# Patient Record
Sex: FEMALE | Race: WHITE | NOT HISPANIC OR LATINO | Employment: FULL TIME | URBAN - METROPOLITAN AREA
[De-identification: names, ages, dates, MRNs, and addresses within clinical notes are randomized per-mention and may not be internally consistent; named-entity substitution may affect disease eponyms.]

---

## 2017-01-28 ENCOUNTER — ALLSCRIPTS OFFICE VISIT (OUTPATIENT)
Dept: OTHER | Facility: OTHER | Age: 47
End: 2017-01-28

## 2017-01-30 ENCOUNTER — ALLSCRIPTS OFFICE VISIT (OUTPATIENT)
Dept: OTHER | Facility: OTHER | Age: 47
End: 2017-01-30

## 2017-06-19 ENCOUNTER — GENERIC CONVERSION - ENCOUNTER (OUTPATIENT)
Dept: OTHER | Facility: OTHER | Age: 47
End: 2017-06-19

## 2017-08-15 ENCOUNTER — ALLSCRIPTS OFFICE VISIT (OUTPATIENT)
Dept: OTHER | Facility: OTHER | Age: 47
End: 2017-08-15

## 2017-10-12 ENCOUNTER — ALLSCRIPTS OFFICE VISIT (OUTPATIENT)
Dept: OTHER | Facility: OTHER | Age: 47
End: 2017-10-12

## 2017-10-16 ENCOUNTER — ALLSCRIPTS OFFICE VISIT (OUTPATIENT)
Dept: OTHER | Facility: OTHER | Age: 47
End: 2017-10-16

## 2018-01-13 VITALS
HEIGHT: 65 IN | BODY MASS INDEX: 32.49 KG/M2 | TEMPERATURE: 97.9 F | SYSTOLIC BLOOD PRESSURE: 134 MMHG | HEART RATE: 64 BPM | RESPIRATION RATE: 12 BRPM | DIASTOLIC BLOOD PRESSURE: 80 MMHG | WEIGHT: 195 LBS

## 2018-01-13 VITALS
DIASTOLIC BLOOD PRESSURE: 70 MMHG | WEIGHT: 195 LBS | HEIGHT: 65 IN | HEART RATE: 84 BPM | SYSTOLIC BLOOD PRESSURE: 110 MMHG | BODY MASS INDEX: 32.49 KG/M2 | TEMPERATURE: 98 F | RESPIRATION RATE: 16 BRPM

## 2018-01-13 VITALS
BODY MASS INDEX: 32.32 KG/M2 | HEIGHT: 65 IN | TEMPERATURE: 98.4 F | SYSTOLIC BLOOD PRESSURE: 118 MMHG | RESPIRATION RATE: 16 BRPM | DIASTOLIC BLOOD PRESSURE: 78 MMHG | HEART RATE: 80 BPM | WEIGHT: 194 LBS

## 2018-01-13 NOTE — CONSULTS
Chief Complaint  Patient here for Pre-op clearance  Bladder sling on 10/24/17      History of Present Illness  Pre-Op Visit (Brief): The procedure is a(n) bladder sling scheduled for 10/24/17 with Dr Tiffany Wright  The indication for surgery is urinary incontinence  Surgical Risk Assessment:   Prior Anesthesia: She had prior anesthesia, no prior adverse reaction to edidural anesthesia, no prior adverse reaction to spinal anesthesia and no prior adverse reaction to general anesthesia  Exercise Capacity: able to walk four blocks without symptoms and able to walk two flights of stairs without symptoms  Lifestyle Factors: denies alcohol use, denies tobacco use and denies illegal drug use  Symptoms: no symptoms  Other MARYELLEN risk factors include normal BMI, female gender, normal neck circumference and age under 48  Predicted risk of MARYELLEN: None  Pertinent Family History: no pertinent family history  Living Situation: home is secure and supportive and no post-op concerns with her living situation  HPI: in usual state of health   no concerns, c/o today      Review of Systems    Constitutional: No fever, no chills, feels well, no tiredness, no recent weight gain or weight loss  Eyes: No complaints of eye pain, no red eyes, no eyesight problems, no discharge, no dry eyes, no itching of eyes  ENT: no complaints of earache, no loss of hearing, no nose bleeds, no nasal discharge, no sore throat, no hoarseness  Cardiovascular: No complaints of slow heart rate, no fast heart rate, no chest pain, no palpitations, no leg claudication, no lower extremity edema  Respiratory: No complaints of shortness of breath, no wheezing, no cough, no SOB on exertion, no orthopnea, no PND  Gastrointestinal: No complaints of abdominal pain, no constipation, no nausea or vomiting, no diarrhea, no bloody stools  Genitourinary: No complaints of dysuria, no incontinence, no pelvic pain, no dysmenorrhea, no vaginal discharge or bleeding  Musculoskeletal: No complaints of arthralgias, no myalgias, no joint swelling or stiffness, no limb pain or swelling  Integumentary: No complaints of skin rash or lesions, no itching, no skin wounds, no breast pain or lump  Neurological: No complaints of headache, no confusion, no convulsions, no numbness, no dizziness or fainting, no tingling, no limb weakness, no difficulty walking  Psychiatric: Not suicidal, no sleep disturbance, no anxiety or depression, no change in personality, no emotional problems  Endocrine: No complaints of proptosis, no hot flashes, no muscle weakness, no deepening of the voice, no feelings of weakness  Hematologic/Lymphatic: No complaints of swollen glands, no swollen glands in the neck, does not bleed easily, does not bruise easily  Active Problems    1  Acute bacterial sinusitis (461 9) (J01 90,B96 89)   2  Acute conjunctivitis (372 00) (H10 30)   3  Bilateral hearing loss (389 9) (H91 93)   4  Encounter for screening mammogram for breast cancer (V76 12) (Z12 31)   5  Swelling of face (784 2) (R22 0)    Past Medical History    · Acute maxillary sinusitis (461 0) (J01 00)   · History of Acute nonsuppurative otitis media, unspecified laterality (381 00) (H65 199)   · Acute sinusitis (461 9) (J01 90)   · History of Acute viral pharyngitis (462) (J02 8,B97 89)   · History of Joint pain, knee (719 46) (M25 569)   · 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)    The active problems and past medical history were reviewed and updated today  Surgical History    · History of Tubal Ligation    The surgical history was reviewed and updated today  Family History    The family history was reviewed and updated today  Social History    · Never A Smoker  The social history was reviewed and updated today  Allergies    1   Penicillins    Vitals  Signs    Temperature: 97 9 FHeart Rate: 19ABZOWLLWTTK: 35LUFHGGRQ: 705FCZSRQJFY: 26JWPSDF: 5 ft 5 inWeight: 195 lb BMI Calculated: 32 45BSA Calculated: 1 96    Physical Exam    Constitutional   General appearance: No acute distress, well appearing and well nourished  Head and Face   Head and face: Normal     Eyes   Conjunctiva and lids: No swelling, erythema or discharge  Pupils and irises: Equal, round, reactive to light  Ears, Nose, Mouth, and Throat   Otoscopic examination: Tympanic membranes translucent with normal light reflex  Canals patent without erythema  Nasal mucosa, septum, and turbinates: Normal without edema or erythema  Oropharynx: Normal with no erythema, edema, exudate or lesions  Neck   Thyroid: Normal, no thyromegaly  Pulmonary   Respiratory effort: No increased work of breathing or signs of respiratory distress  Auscultation of lungs: Clear to auscultation  Cardiovascular   Auscultation of heart: Normal rate and rhythm, normal S1 and S2, no murmurs  Carotid pulses: 2+ bilaterally  Abdominal aorta: Normal     Pedal pulses: 2+ bilaterally  Peripheral vascular exam: Normal     Examination of extremities for edema and/or varicosities: Normal     Abdomen   Abdomen: Non-tender, no masses  Liver and spleen: No hepatomegaly or splenomegaly  Lymphatic   Palpation of lymph nodes in neck: No lymphadenopathy  Musculoskeletal   Gait and station: Normal     Skin   Skin and subcutaneous tissue: Normal without rashes or lesions  Psychiatric   Judgment and insight: Normal     Mood and affect: Normal        Results/Data  PHQ-2 Adult Depression Screening 12Oct2017 08:46AM User, s     Test Name Result Flag Reference   PHQ-2 Adult Depression Score 0     Over the last two weeks, how often have you been bothered by any of the following problems?   Little interest or pleasure in doing things: Not at all - 0  Feeling down, depressed, or hopeless: Not at all - 0   PHQ-2 Adult Depression Screening Negative       A 12 lead ECG was performed and was normal    Rhythm and rate: normal sinus rhythm  P-waves: the P wave is normal    QRS: the QRS is normal    ST segment: the ST segments are normal       Assessment    1  Encounter for pre-operative examination (V72 84) (Z01 818)   2  Urinary incontinence in female (788 30) (R32)    Plan     Encounter for pre-operative examination, Urinary incontinence in female    · Follow-up PRN Evaluation and Treatment  Follow-up  Status: Complete  Done:  12Oct2017    Discussion/Summary  Surgical Clearance: She is at a LOW risk from a cardiovascular standpoint at this time without any additional cardiac testing  Reevaluation needed, if she should present with symptoms prior to surgery/procedure  Surgical clearance faxed to Dr Dr Walton Flank         Signatures   Electronically signed by : MAKAYLA Durna; Oct 12 2017  8:56AM EST                       (Author)    Electronically signed by : Saturnino Lauren DO; Oct 12 2017 11:49AM EST                       (Author)

## 2018-01-14 VITALS
SYSTOLIC BLOOD PRESSURE: 128 MMHG | HEART RATE: 78 BPM | TEMPERATURE: 98.4 F | HEIGHT: 65 IN | RESPIRATION RATE: 14 BRPM | DIASTOLIC BLOOD PRESSURE: 90 MMHG | BODY MASS INDEX: 32.65 KG/M2 | WEIGHT: 196 LBS

## 2018-01-15 NOTE — MISCELLANEOUS
Message  Return to work or school:   Oscar Crabtree is under my professional care  She was seen in my office on 10/16/17       Excused 10/16, 10/17/17  Angi BENAVIDES        Signatures   Electronically signed by : MAKAYLA Zayas; Oct 16 2017  9:52AM EST                       (Author)

## 2018-01-22 VITALS
BODY MASS INDEX: 32.99 KG/M2 | RESPIRATION RATE: 14 BRPM | DIASTOLIC BLOOD PRESSURE: 90 MMHG | TEMPERATURE: 98.2 F | WEIGHT: 198 LBS | HEIGHT: 65 IN | HEART RATE: 72 BPM | SYSTOLIC BLOOD PRESSURE: 140 MMHG

## 2018-01-31 ENCOUNTER — OFFICE VISIT (OUTPATIENT)
Dept: FAMILY MEDICINE CLINIC | Facility: CLINIC | Age: 48
End: 2018-01-31
Payer: COMMERCIAL

## 2018-01-31 VITALS
BODY MASS INDEX: 33.15 KG/M2 | HEART RATE: 80 BPM | RESPIRATION RATE: 16 BRPM | HEIGHT: 65 IN | DIASTOLIC BLOOD PRESSURE: 90 MMHG | SYSTOLIC BLOOD PRESSURE: 130 MMHG | WEIGHT: 199 LBS | TEMPERATURE: 98 F

## 2018-01-31 DIAGNOSIS — J01.80 ACUTE NON-RECURRENT SINUSITIS OF OTHER SINUS: Primary | ICD-10-CM

## 2018-01-31 PROCEDURE — 99213 OFFICE O/P EST LOW 20 MIN: CPT | Performed by: FAMILY MEDICINE

## 2018-01-31 RX ORDER — NORETHINDRONE ACETATE AND ETHINYL ESTRADIOL AND FERROUS FUMARATE 1MG-20(21)
1 KIT ORAL DAILY
Refills: 0 | COMMUNITY
Start: 2017-11-17 | End: 2018-07-12

## 2018-01-31 NOTE — LETTER
January 31, 2018     Patient: Andrew Greene   YOB: 1970   Date of Visit: 1/31/2018       To Whom it May Concern:    Andrew Greene is under my professional care  She was seen in my office on 1/31/2018  She may return to work on 2/1/18  If you have any questions or concerns, please don't hesitate to call           Sincerely,          Ronna Le MD        CC: No Recipients

## 2018-01-31 NOTE — PROGRESS NOTES
Jolene     Janice Valencia is a 52 y o  female who presents for evaluation of sinus pain  Symptoms include: congestion, facial pain, headaches, sinus pressure and b/l earache  Onset of symptoms was 1 day ago  Symptoms have been unchanged since that time  Past history is significant for no history of pneumonia or bronchitis  Patient is a non-smoker  No therapy tried, had flu like symptoms 2 wks ago -  Was Tx with tamiflu     The following portions of the patient's history were reviewed and updated as appropriate: allergies, current medications, past family history, past medical history, past social history, past surgical history and problem list     Review of Systems  Eyes: negative  Respiratory: negative  Gastrointestinal: negative  Objective     General appearance: alert and oriented, in no acute distress  Head: Normocephalic, without obvious abnormality, atraumatic, sinuses tender to percussion  Ears: normal TM's and external ear canals both ears  Nose: Nares normal  Septum midline  Mucosa normal  No drainage or sinus tenderness  Throat: lips, mucosa, and tongue normal; teeth and gums normal  Lungs: clear to auscultation bilaterally  Assessment/Plan     Acute viral sinusitis  Nasal saline sprays  MucinexD OTC, fluids  rto prn

## 2018-01-31 NOTE — PATIENT INSTRUCTIONS
Rhinosinusitis   AMBULATORY CARE:   Rhinosinusitis (RS)  is inflammation of your nose and sinuses  It commonly begins as a virus, often as a common cold  Viruses usually last 7 to 10 days and do not need treatment  When the virus does not get better on its own, you may have bacterial RS  This means that bacteria have begun to grow inside your sinuses  Acute RS lasts less than 4 weeks  Chronic RS lasts 12 weeks or more  Recurrent RS is when you have 4 or more episodes of RS in one year  Your signs and symptoms  may be worse when you lie on your back or try to sleep  You may have any of the following:  · Stuffy nose and reduced sense of smell     · Runny nose with thick yellow or green mucus     · Pressure or pain on your face or a headache     · Pain in your teeth or bad breath     · Ear pain or pressure     · Fever or cough     · Tiredness  Seek care immediately if:   · You have double vision or you cannot see  · You have a stiff neck, a fever, or a bad headache  · Your eyeball bulges out or you cannot move your eye  · Your eye and eyelid are red, swollen, and painful  · You cannot open your eye  · You are more sleepy than normal, or you notice changes in your ability to think, move, or talk  · You have swelling of your forehead or scalp  Contact your healthcare provider if:   · Your symptoms are worse or do not improve after 3 to 5 days of treatment  · You have questions or concerns about your condition or care  Treatment for rhinosinusitis  may include any of the following:  · Acetaminophen  decreases pain and fever  It is available without a doctor's order  Ask how much to take and how often to take it  Follow directions  Acetaminophen can cause liver damage if not taken correctly  · NSAIDs , such as ibuprofen, help decrease swelling, pain, and fever  This medicine is available with or without a doctor's order   NSAIDs can cause stomach bleeding or kidney problems in certain people  If you take blood thinner medicine, always ask your healthcare provider if NSAIDs are safe for you  Always read the medicine label and follow directions  · Nasal steroid sprays  decrease inflammation in your nose and sinuses  · Decongestants  reduce swelling and drain mucus in the nose and sinuses  They may help you breathe easier  · Antihistamines  dry mucus in the nose and relieve sneezing  · Antibiotics  treat a bacterial infection and may be needed if your symptoms do not improve or they get worse  · Take your medicine as directed  Contact your healthcare provider if you think your medicine is not helping or if you have side effects  Tell him or her if you are allergic to any medicine  Keep a list of the medicines, vitamins, and herbs you take  Include the amounts, and when and why you take them  Bring the list or the pill bottles to follow-up visits  Carry your medicine list with you in case of an emergency  Self-care:   · Rinse your sinuses  Use a sinus rinse device to rinse your nasal passages with a saline (salt water) solution  This will help thin the mucus in your nose and rinse away pollen and dirt  It will also help reduce swelling so you can breathe normally  Ask your healthcare provider how often to do this  · Breathe in steam   Heat a bowl of water until you see steam  Lean over the bowl and make a tent over your head with a large towel  Breathe deeply for about 20 minutes  Be careful not to get too close to the steam or burn yourself  Do this 3 times a day  You can also breathe deeply when you take a hot shower  · Sleep with your head elevated  Place an extra pillow under your head before you go to sleep to help your sinuses drain  · Drink liquids as directed  Ask your healthcare provider how much liquid to drink each day and which liquids are best for you  Liquids will thin the mucus in your nose and help it drain   Avoid drinks that contain alcohol or caffeine  · Do not smoke, and avoid secondhand smoke  Nicotine and other chemicals in cigarettes and cigars can make your symptoms worse  Ask your healthcare provider for information if you currently smoke and need help to quit  E-cigarettes or smokeless tobacco still contain nicotine  Talk to your healthcare provider before you use these products  Follow up with your healthcare provider as directed: Follow up if your symptoms are worse or not better after 3 to 5 days of treatment  Write down your questions so you remember to ask them during your visits  © 2017 [employer] Information is for End User's use only and may not be sold, redistributed or otherwise used for commercial purposes  All illustrations and images included in CareNotes® are the copyrighted property of A D A M [employer] Inc  or [employer]  The above information is an  only  It is not intended as medical advice for individual conditions or treatments  Talk to your doctor, nurse or pharmacist before following any medical regimen to see if it is safe and effective for you

## 2018-03-19 ENCOUNTER — OFFICE VISIT (OUTPATIENT)
Dept: AUDIOLOGY | Facility: CLINIC | Age: 48
End: 2018-03-19
Payer: COMMERCIAL

## 2018-03-19 DIAGNOSIS — H91.93 BILATERAL HEARING LOSS, UNSPECIFIED HEARING LOSS TYPE: Primary | ICD-10-CM

## 2018-03-19 DIAGNOSIS — H90.3 SENSORINEURAL HEARING LOSS, BILATERAL: Primary | ICD-10-CM

## 2018-03-19 PROCEDURE — 92567 TYMPANOMETRY: CPT | Performed by: AUDIOLOGIST

## 2018-03-19 PROCEDURE — 92557 COMPREHENSIVE HEARING TEST: CPT | Performed by: AUDIOLOGIST

## 2018-03-19 NOTE — LETTER
2018     Saturnino Lauren, [address] [place] [address]    Patient: Becka Waller   YOB: 1970   Date of Visit: 3/19/2018       Dear Dr Sharyle Raddle: Thank you for referring Becka Waller to me for evaluation  Below are my notes for this consultation  If you have questions, please do not hesitate to call me  Sincerely,        Miranda Cedeño , CCC/A  Clinical Audiologist   [place]  [id number]        CC: No Recipients  Stephanie Bolden  3/20/2018 10:17 AM  Sign at close encounter  [facility] [address] EVALUATION      Name:  Becka Waller  :  1970  Age:  52 y o  Date of Evaluation: 3/19/18    History: hearing loss and use of binaural DEIDRE's from 2016  Reason for visit: Becka Waller is being seen today at the request of Dr Sharyle Raddle for an evaluation of hearing  It has been two years since her last evaluation and she is reporting more difficulty hearing lately  Hearing aid history 2016; warranty exp  19       Right Ear:  Mau Helling 2 Pro DEIDRE       Left Ear:  Mau Helling 2 Pro DEIDRE     Hearing aid settings were checked and increased by two clicks overall  She has VC use and has been using appropriately  Aids were cleaned and checked  EVALUATION:    Otoscopic Evaluation:   Right Ear: Clear and healthy ear canal and tympanic membrane   Left Ear: Clear and healthy ear canal and tympanic membrane    Tympanometry:   See attached     Audiogram Results:  Mild to moderate SNHL bilaterally with good word recognition scores  No significant threshold shifts from previous testing in 2016    *see attached audiograms    RECOMMENDATIONS:  1 Year Hearing Eval and Other:hearing aid service 6 months     PATIENT EDUCATION:   Discussed results and recommendations with Mrs Kaylan Martinez  Questions were addressed and the patient was encouraged to contact our department should concerns arise        Miranda Cedeño , CCC-A, [place]# [id number], Hearing Aid KE[facility]# [id number]  Clinical Audiologist

## 2018-03-20 ENCOUNTER — OFFICE VISIT (OUTPATIENT)
Dept: FAMILY MEDICINE CLINIC | Facility: CLINIC | Age: 48
End: 2018-03-20
Payer: COMMERCIAL

## 2018-03-20 VITALS
DIASTOLIC BLOOD PRESSURE: 74 MMHG | TEMPERATURE: 97.7 F | RESPIRATION RATE: 16 BRPM | HEART RATE: 72 BPM | HEIGHT: 65 IN | WEIGHT: 198 LBS | SYSTOLIC BLOOD PRESSURE: 136 MMHG | BODY MASS INDEX: 32.99 KG/M2

## 2018-03-20 DIAGNOSIS — N94.6 SEVERE DYSMENORRHEA: ICD-10-CM

## 2018-03-20 DIAGNOSIS — R10.2 PELVIC PAIN: Primary | ICD-10-CM

## 2018-03-20 LAB
SL AMB  POCT GLUCOSE, UA: ABNORMAL
SL AMB LEUKOCYTE ESTERASE,UA: ABNORMAL
SL AMB POCT BILIRUBIN,UA: ABNORMAL
SL AMB POCT BLOOD,UA: 250
SL AMB POCT CLARITY,UA: CLEAR
SL AMB POCT COLOR,UA: ABNORMAL
SL AMB POCT KETONES,UA: ABNORMAL
SL AMB POCT NITRITE,UA: ABNORMAL
SL AMB POCT PH,UA: 5
SL AMB POCT SPECIFIC GRAVITY,UA: 1.02
SL AMB POCT URINE PROTEIN: ABNORMAL
SL AMB POCT UROBILINOGEN: ABNORMAL

## 2018-03-20 PROCEDURE — 81003 URINALYSIS AUTO W/O SCOPE: CPT | Performed by: NURSE PRACTITIONER

## 2018-03-20 PROCEDURE — 99213 OFFICE O/P EST LOW 20 MIN: CPT | Performed by: NURSE PRACTITIONER

## 2018-03-20 NOTE — PATIENT INSTRUCTIONS
Dysmenorrhea   WHAT YOU NEED TO KNOW:   What is dysmenorrhea? Dysmenorrhea is painful menstrual cramps at or around the time of your monthly period  What causes dysmenorrhea? Your body normally produces chemicals each month to help your uterus contract  When too many of these chemicals are made, your uterus contracts too much and causes pain  Dysmenorrhea may also be caused by any of the following:  · Abnormal structure of your uterus or vagina    · A narrow cervix    · Growth in or on your uterus or ovaries    · Medical conditions, such as pelvic inflammatory disease, endometriosis, or uterine fibroids    · A copper intrauterine device (IUD)  What increases my risk for dysmenorrhea? · Never been pregnant    · Obesity    · Smoking    · Family history of painful menstrual cramps    · Pelvic infection    · Longer monthly period cycle    · Medical conditions, such as a sexually transmitted infection or endometriosis  What are the signs and symptoms of dysmenorrhea? · Mild to severe pain    · Cramping pain in lower abdomen or low back    · Bloating    · Headache    · Diarrhea  How is dysmenorrhea diagnosed? Your healthcare provider can usually diagnose dysmenorrhea by your signs and symptoms  Tell him when your symptoms started and if you have pain between your monthly periods  He may ask if anything relieves your pain, such as heat or medicine  Tell your healthcare provider if you are sexually active or have ever been pregnant  You may need any of the following:  · A blood test  will check for pregnancy  · A pelvic exam  may be needed to check the size and shape of your uterus and ovaries  Your healthcare provider gently inserts a warmed speculum into your vagina  A speculum is a tool that opens your vagina to show your cervix  · A cervical culture  may be needed to check for infection  Your healthcare provider will use a swab to collect a sample of cells from your cervix   This will be sent to a lab for tests     · An ultrasound  will show abnormal structure of your reproductive organs  Sound waves are used to show pictures on a monitor  How is dysmenorrhea treated? Dysmenorrhea can be controlled with lifestyle changes and medicines  It usually improves with age and pregnancy  · Medicines:      ¨ NSAIDs  help decrease swelling and pain or fever  This medicine is available with or without a doctor's order  NSAIDs can cause stomach bleeding or kidney problems in certain people  If you take blood thinner medicine, always ask your healthcare provider if NSAIDs are safe for you  Always read the medicine label and follow directions  ¨ Birth control medicine  may help decrease your pain  This medicine may be birth control pills or an IUD that does not contain copper  · Transcutaneous electric nerve stimulation  (TENS), is a device used to stimulate your nerves and decrease pain  Ask your healthcare provider for more information about TENS  How can I manage my symptoms? · Eat low-fat foods  Increase the amount of vegetables and raw seeds you eat  Ask your healthcare provider if you should take vitamin B or magnesium supplements  These will help decrease your pain  Do not eat dairy products or eggs  · Apply heat  on your lower abdomen for 20 to 30 minutes every 2 hours for as many days as directed  Heat helps decrease pain and muscle spasms  · Manage your stress  Stress can make your symptoms worse  Try relaxation exercises, such as deep breathing  · Exercise regularly  Ask your healthcare provider about the best exercise plan for you  Exercise can help decrease pain  · Keep a record of your pain  Write down when your pain and periods start and stop  Bring the record with you to your follow-up visits  · Do not smoke  Avoid others who smoke  If you smoke, it is never too late to quit  Smoking can increase your risk for dysmenorrhea   Ask your healthcare provider for information if you need help quitting  When should I contact my healthcare provider? · You have anxiety or feel depressed  · Your periods are early, late, or more painful than usual     · You have questions or concerns about your condition or care  When should I seek immediate care or call 911? · You have severe pain  · You have heavy vaginal bleeding and you feel faint  · You have sudden chest pain and trouble breathing  CARE AGREEMENT:   You have the right to help plan your care  Learn about your health condition and how it may be treated  Discuss treatment options with your caregivers to decide what care you want to receive  You always have the right to refuse treatment  The above information is an  only  It is not intended as medical advice for individual conditions or treatments  Talk to your doctor, nurse or pharmacist before following any medical regimen to see if it is safe and effective for you  © 2017 [employer] Information is for End User's use only and may not be sold, redistributed or otherwise used for commercial purposes  All illustrations and images included in CareNotes® are the copyrighted property of A D A M , Inc  or [facility]

## 2018-03-20 NOTE — PROGRESS NOTES
Assessment/Plan:    Problem List Items Addressed This Visit     None      Visit Diagnoses     Pelvic pain    -  Primary    Relevant Orders    US pelvis complete non OB    Severe dysmenorrhea        Relevant Orders    US pelvis complete non OB      UA negative  Check pelvic u/s r/o fibroids, cysts  Cont OCP  Start ibu 2 days prior to menses onset  rto after testing    Patient Instructions   Dysmenorrhea   WHAT YOU NEED TO KNOW:   What is dysmenorrhea? Dysmenorrhea is painful menstrual cramps at or around the time of your monthly period  What causes dysmenorrhea? Your body normally produces chemicals each month to help your uterus contract  When too many of these chemicals are made, your uterus contracts too much and causes pain  Dysmenorrhea may also be caused by any of the following:  · Abnormal structure of your uterus or vagina    · A narrow cervix    · Growth in or on your uterus or ovaries    · Medical conditions, such as pelvic inflammatory disease, endometriosis, or uterine fibroids    · A copper intrauterine device (IUD)  What increases my risk for dysmenorrhea? · Never been pregnant    · Obesity    · Smoking    · Family history of painful menstrual cramps    · Pelvic infection    · Longer monthly period cycle    · Medical conditions, such as a sexually transmitted infection or endometriosis  What are the signs and symptoms of dysmenorrhea? · Mild to severe pain    · Cramping pain in lower abdomen or low back    · Bloating    · Headache    · Diarrhea  How is dysmenorrhea diagnosed? Your healthcare provider can usually diagnose dysmenorrhea by your signs and symptoms  Tell him when your symptoms started and if you have pain between your monthly periods  He may ask if anything relieves your pain, such as heat or medicine  Tell your healthcare provider if you are sexually active or have ever been pregnant  You may need any of the following:  · A blood test  will check for pregnancy      · A pelvic exam may be needed to check the size and shape of your uterus and ovaries  Your healthcare provider gently inserts a warmed speculum into your vagina  A speculum is a tool that opens your vagina to show your cervix  · A cervical culture  may be needed to check for infection  Your healthcare provider will use a swab to collect a sample of cells from your cervix  This will be sent to a lab for tests  · An ultrasound  will show abnormal structure of your reproductive organs  Sound waves are used to show pictures on a monitor  How is dysmenorrhea treated? Dysmenorrhea can be controlled with lifestyle changes and medicines  It usually improves with age and pregnancy  · Medicines:      ¨ NSAIDs  help decrease swelling and pain or fever  This medicine is available with or without a doctor's order  NSAIDs can cause stomach bleeding or kidney problems in certain people  If you take blood thinner medicine, always ask your healthcare provider if NSAIDs are safe for you  Always read the medicine label and follow directions  ¨ Birth control medicine  may help decrease your pain  This medicine may be birth control pills or an IUD that does not contain copper  · Transcutaneous electric nerve stimulation  (TENS), is a device used to stimulate your nerves and decrease pain  Ask your healthcare provider for more information about TENS  How can I manage my symptoms? · Eat low-fat foods  Increase the amount of vegetables and raw seeds you eat  Ask your healthcare provider if you should take vitamin B or magnesium supplements  These will help decrease your pain  Do not eat dairy products or eggs  · Apply heat  on your lower abdomen for 20 to 30 minutes every 2 hours for as many days as directed  Heat helps decrease pain and muscle spasms  · Manage your stress  Stress can make your symptoms worse  Try relaxation exercises, such as deep breathing  · Exercise regularly    Ask your healthcare provider about the best exercise plan for you  Exercise can help decrease pain  · Keep a record of your pain  Write down when your pain and periods start and stop  Bring the record with you to your follow-up visits  · Do not smoke  Avoid others who smoke  If you smoke, it is never too late to quit  Smoking can increase your risk for dysmenorrhea  Ask your healthcare provider for information if you need help quitting  When should I contact my healthcare provider? · You have anxiety or feel depressed  · Your periods are early, late, or more painful than usual     · You have questions or concerns about your condition or care  When should I seek immediate care or call 911? · You have severe pain  · You have heavy vaginal bleeding and you feel faint  · You have sudden chest pain and trouble breathing  CARE AGREEMENT:   You have the right to help plan your care  Learn about your health condition and how it may be treated  Discuss treatment options with your caregivers to decide what care you want to receive  You always have the right to refuse treatment  The above information is an  only  It is not intended as medical advice for individual conditions or treatments  Talk to your doctor, nurse or pharmacist before following any medical regimen to see if it is safe and effective for you  © 2017 [employer]  Information is for End User's use only and may not be sold, redistributed or otherwise used for commercial purposes  All illustrations and images included in CareNotes® are the copyrighted property of [employer]  or [employer]  Return after testing  Subjective:      Patient ID: Eileen Kingston is a 52 y o  female  Chief Complaint   Patient presents with    Back Pain     Abdominal Pain, during PMS, 3x in last 6 months        Pelvic Pain   The patient's primary symptoms include pelvic pain  The patient's pertinent negatives include no missed menses   Primary symptoms comment: severe cramping during menses  This is a recurrent problem  The current episode started yesterday  The problem occurs constantly  The problem has been unchanged  The pain is moderate  The problem affects both sides  She is not pregnant  Associated symptoms include back pain  Pertinent negatives include no anorexia, constipation, diarrhea, discolored urine, dysuria, fever, frequency, nausea or vomiting  Nothing aggravates the symptoms  She has tried NSAIDs for the symptoms  The treatment provided mild relief  She uses oral contraceptives for contraception  Her menstrual history has been regular  Her past medical history is significant for menorrhagia and metrorrhagia  (No known fibriods, ovarian cysts)       The following portions of the patient's history were reviewed and updated as appropriate: allergies, current medications, past family history, past medical history, past social history, past surgical history and problem list     Review of Systems   Constitutional: Negative for fever  Respiratory: Negative  Cardiovascular: Negative  Gastrointestinal: Negative for anorexia, constipation, diarrhea, nausea and vomiting  Endocrine: Negative  Genitourinary: Positive for menorrhagia, menstrual problem and pelvic pain  Negative for dyspareunia, dysuria, frequency and missed menses  Musculoskeletal: Positive for back pain  Neurological: Negative  Hematological: Does not bruise/bleed easily  Current Outpatient Prescriptions   Medication Sig Dispense Refill    JUNEL FE 1/20 1-20 MG-MCG per tablet Take 1 tablet by mouth daily  0     No current facility-administered medications for this visit          Objective:    /74 (BP Location: Left arm, Patient Position: Sitting, Cuff Size: Adult)   Pulse 72   Temp 97 7 °F (36 5 °C)   Resp 16   Ht 5' 5" (1 651 m)   Wt 89 8 kg (198 lb)   LMP 03/20/2018   BMI 32 95 kg/m²        Physical Exam   Constitutional: She is oriented to person, place, and time  She appears well-developed and well-nourished  No distress  Neck: No thyromegaly present  Cardiovascular: Normal rate, regular rhythm, normal heart sounds and intact distal pulses  No murmur heard  Pulmonary/Chest: Effort normal and breath sounds normal  No respiratory distress  Abdominal: Soft  Normal appearance and bowel sounds are normal  There is tenderness in the suprapubic area  Musculoskeletal:        Lumbar back: She exhibits tenderness  She exhibits normal range of motion and no deformity  Neurological: She is alert and oriented to person, place, and time  Psychiatric: She has a normal mood and affect  Vitals reviewed               Rikki Bautista, [address] [place] [address]

## 2018-03-20 NOTE — PROGRESS NOTES
AUDIOLOGY AUDIOMETRIC EVALUATION      Name:  Jasen Cardenas  :  1970  Age:  52 y o  Date of Evaluation: 3/19/18    History: hearing loss and use of binaural DEIDRE's from 2016  Reason for visit: Jasen Cardenas is being seen today at the request of Dr Briana Rubinstein for an evaluation of hearing  It has been two years since her last evaluation and she is reporting more difficulty hearing lately  Hearing aid history 2016; warranty exp  19       Right Ear:  Charyl Motto 2 Pro DEIDRE       Left Ear:  Charyl Motto 2 Pro DEIDRE     Hearing aid settings were checked and increased by two clicks overall  She has VC use and has been using appropriately  Aids were cleaned and checked  EVALUATION:    Otoscopic Evaluation:   Right Ear: Clear and healthy ear canal and tympanic membrane   Left Ear: Clear and healthy ear canal and tympanic membrane    Tympanometry:   See attached     Audiogram Results:  Mild to moderate SNHL bilaterally with good word recognition scores  No significant threshold shifts from previous testing in 2016    *see attached audiograms    RECOMMENDATIONS:  1 Year Hearing Eval and Other:hearing aid service 6 months     PATIENT EDUCATION:   Discussed results and recommendations with Mrs Demetrius Albert  Questions were addressed and the patient was encouraged to contact our department should concerns arise        Miranda James , CCC-A, [place]# [id number], Hearing Aid Dispenser, [place]# [id number]  Clinical Audiologist

## 2018-03-28 DIAGNOSIS — R10.2 PELVIC PAIN: Primary | ICD-10-CM

## 2018-03-28 DIAGNOSIS — N94.6 SEVERE DYSMENORRHEA: ICD-10-CM

## 2018-07-12 ENCOUNTER — OFFICE VISIT (OUTPATIENT)
Dept: FAMILY MEDICINE CLINIC | Facility: CLINIC | Age: 48
End: 2018-07-12
Payer: COMMERCIAL

## 2018-07-12 VITALS
HEIGHT: 65 IN | SYSTOLIC BLOOD PRESSURE: 140 MMHG | BODY MASS INDEX: 33.15 KG/M2 | WEIGHT: 199 LBS | RESPIRATION RATE: 16 BRPM | DIASTOLIC BLOOD PRESSURE: 90 MMHG | HEART RATE: 68 BPM | TEMPERATURE: 97.5 F

## 2018-07-12 DIAGNOSIS — R94.6 ABNORMAL THYROID FUNCTION TEST: ICD-10-CM

## 2018-07-12 DIAGNOSIS — R03.0 TRANSIENT ELEVATED BLOOD PRESSURE: Primary | ICD-10-CM

## 2018-07-12 DIAGNOSIS — E01.0 THYROMEGALY: ICD-10-CM

## 2018-07-12 PROCEDURE — 99213 OFFICE O/P EST LOW 20 MIN: CPT | Performed by: NURSE PRACTITIONER

## 2018-07-12 NOTE — PROGRESS NOTES
Assessment/Plan:    Problem List Items Addressed This Visit     None      Visit Diagnoses     Transient elevated blood pressure    -  Primary    Relevant Orders    US thyroid    Abnormal thyroid function test         7/11/18    Relevant Orders    US thyroid    Thyromegaly        Relevant Orders    US thyroid        Blood work not available while pt in office  Called pt and review labs and treatment plan  HTN is new dx  Asymptomatic as this time  Will check at work  rto in 2 weeks for recheck  If remains elevated, will start low dose med  Only abn thyroid lab is   She is asymptomatic  Advised monitoring closely with thyroid u/s  Recheck levels in 6 weeks  Hold off on meds for now unless she becomes symptomatic  Patient Instructions   Autoimmune Disease   AMBULATORY CARE:   An autoimmune disease  causes your body's immune system to attack healthy cells in your body by mistake  This causes inflammation in the affected areas  The disease may affect any part of your body, including skin, organs, blood, your digestive system, and connective tissues  There are many autoimmune diseases, such as lupus, celiac disease, and diabetes  Common signs and symptoms:  Signs and symptoms depend on the type of autoimmune disease and the body systems affected  Symptoms may be mild or severe, and may come and go  You may have many of the following if you have an autoimmune disease that affects more than one body system:  · Red, warm, painful, swollen area or joints    · Joint pain, stiffness, or reduced range of motion    · Tiredness, weakness, or muscle pain    · Fever    · Weight gain or loss, or no appetite     · Diarrhea, stomach cramps, or bloating    · Hair loss    · Rash or changes in skin color    · Red, inflamed eyes  Seek care immediately if:   · You have severe pain or swelling  · You have a fever along with stiffness and pain  · You have blood in your urine, bowel movement, or vomit      · You have severe abdominal pain  · You are confused or feel dizzy or faint  Contact your healthcare provider if:   · You have new or worsening symptoms  · You are urinating less than usual     · You are bleeding from your nose or gums  · You bruise easily  · You have questions or concerns about your condition or care  Treatment  will depend on the type of autoimmune disease you have  You may need any of the following:  · Medicines  may be given to replace thyroid hormones, insulin, or other hormones your body is not producing  Medicines may also reduce your immune system's ability to attack healthy cells or decrease inflammation in muscles or joints  You may need to use topical creams or lotions to control a rash or other symptoms that affect your skin  · Prescription pain medicine  may be given  Ask your healthcare provider how to take this medicine safely  · NSAIDs , such as ibuprofen, help decrease swelling, pain, and fever  This medicine is available with or without a doctor's order  NSAIDs can cause stomach bleeding or kidney problems in certain people  If you take blood thinner medicine, always ask your healthcare provider if NSAIDs are safe for you  Always read the medicine label and follow directions  · Acetaminophen  reduces pain and fever  This medicine is available without a doctor's order  Ask how much to take and how often to take it  Follow directions  Acetaminophen can cause liver damage if not taken correctly  · Steroids  help reduce swelling and relieve pain  · A blood transfusion  may be needed if your blood is affected  Manage an autoimmune disease:   · Rest as needed  Talk to your healthcare provider if you are having trouble sleeping because of pain or other symptoms  Rest your joints if they are stiff or painful  Your healthcare provider may suggest support devices such as crutches or splints to help your joints rest      · Eat a variety of healthy foods    Healthy foods include fruits, vegetables, lean meats, fish, and low-fat dairy products  Work with your healthcare provider or dietitian to create healthy meal plans  You may need to stop eating certain foods if your digestive system is affected by your autoimmune disease  · Go to physical or occupational therapy as directed  A physical therapist can help you create an exercise plan  Exercise may help increase your energy  Exercise can also help keep stiff joints flexible and increase range of motion  An occupational therapist can help you learn to do your daily activities when you have pain or swelling during a flare  · Do not smoke  Nicotine can damage blood vessels and make it more difficult to manage your symptoms  Ask your healthcare provider for information if you currently smoke and need help to quit  E-cigarettes or smokeless tobacco still contain nicotine  Talk to your healthcare provider before you use these products  · Talk to your healthcare provider about pregnancy  If you are a woman and want to get pregnant, talk to your healthcare provider  You or your baby might be at risk for complications  You may need to wait until your disease is controlled or your medications are finished before you get pregnant  You may also have trouble getting pregnant because of your disease  Your healthcare provider may be able to suggest ways to improve your ability to become pregnant  · Manage stress  Stress may slow healing and lead to illness  Learn ways to control stress, such as relaxation, deep breathing, or listening to music  Manage flares:  A flare means something triggered your symptoms  Stress, cold weather, and sunlight are examples of triggers  Your healthcare provider can help you create a management plan that includes what to do if you have a flare  Treat flares quickly to help prevent serious illness  · Apply ice or heat as directed    Ice helps reduce pain and swelling, and may help prevent tissue damage  Use a cold compress, or put crushed ice in a bag  Cover it with a towel and apply to the painful area for 15 to 20 minutes every hour, or as directed  Heat helps reduce pain and muscle spasms  Apply a warm compress to the area for 20 minutes every 2 hours, or as directed  · Elevate the area above the level of your heart  Elevation can help reduce swelling and pain, especially in your joints  Elevate the area as often as possible  Follow up with your healthcare provider as directed: You may need ongoing tests or treatment  Write down your questions so you remember to ask them during your visits  © 2017 [employer] [employer] Information is for End User's use only and may not be sold, redistributed or otherwise used for commercial purposes  All illustrations and images included in CareNotes® are the copyrighted property of [employer]  or [employer]  The above information is an  only  It is not intended as medical advice for individual conditions or treatments  Talk to your doctor, nurse or pharmacist before following any medical regimen to see if it is safe and effective for you  Return in about 2 weeks (around 7/26/2018)  Subjective:      Patient ID: Leonidas Velazco is a 50 y o  female  Chief Complaint   Patient presents with    Blood Pressure Check     178/104 yesterday at [facility]    then 148/98 this morning  Honey sent by Pawel Tam for elevated BP and abn thyroid studies    She is essentially asymptomatic aside from severe PMS sxs    Was seen on 7/11/18 for OCP check     BP was significantly elevated  178/102    She has never had bp problems    Blood work was drawn which revealed elevated TPO  TSH 2 4  T3 126, T4 9 8   Lipids borderline, cbc, cmp wnl    Denies hx of thyroid problems        The following portions of the patient's history were reviewed and updated as appropriate: allergies, current medications, past family history, past medical history, past social history, past surgical history and problem list     Review of Systems   Constitutional: Negative  Eyes: Negative for visual disturbance  Respiratory: Negative  Cardiovascular: Negative  Endocrine: Negative  Genitourinary: Positive for menstrual problem  Neurological: Negative  Psychiatric/Behavioral: Negative  No current outpatient prescriptions on file  No current facility-administered medications for this visit  Objective:    /90 (BP Location: Left arm, Patient Position: Sitting, Cuff Size: Adult)   Pulse 68   Temp 97 5 °F (36 4 °C) (Temporal)   Resp 16   Ht 5' 5" (1 651 m)   Wt 90 3 kg (199 lb)   BMI 33 12 kg/m²        Physical Exam   Constitutional: She is oriented to person, place, and time  Vital signs are normal  She appears well-developed and well-nourished  No distress  HENT:   Mouth/Throat: Oropharynx is clear and moist    Neck: Thyromegaly present  Cardiovascular: Normal rate, regular rhythm, normal heart sounds and intact distal pulses  Pulmonary/Chest: Effort normal and breath sounds normal  No respiratory distress  Musculoskeletal: She exhibits no edema  Neurological: She is alert and oriented to person, place, and time  No cranial nerve deficit  Coordination normal    Skin: Skin is warm and dry  Psychiatric: She has a normal mood and affect  Her behavior is normal    Nursing note and vitals reviewed               [address]

## 2018-07-12 NOTE — PATIENT INSTRUCTIONS
Autoimmune Disease   AMBULATORY CARE:   An autoimmune disease  causes your body's immune system to attack healthy cells in your body by mistake  This causes inflammation in the affected areas  The disease may affect any part of your body, including skin, organs, blood, your digestive system, and connective tissues  There are many autoimmune diseases, such as lupus, celiac disease, and diabetes  Common signs and symptoms:  Signs and symptoms depend on the type of autoimmune disease and the body systems affected  Symptoms may be mild or severe, and may come and go  You may have many of the following if you have an autoimmune disease that affects more than one body system:  · Red, warm, painful, swollen area or joints    · Joint pain, stiffness, or reduced range of motion    · Tiredness, weakness, or muscle pain    · Fever    · Weight gain or loss, or no appetite     · Diarrhea, stomach cramps, or bloating    · Hair loss    · Rash or changes in skin color    · Red, inflamed eyes  Seek care immediately if:   · You have severe pain or swelling  · You have a fever along with stiffness and pain  · You have blood in your urine, bowel movement, or vomit  · You have severe abdominal pain  · You are confused or feel dizzy or faint  Contact your healthcare provider if:   · You have new or worsening symptoms  · You are urinating less than usual     · You are bleeding from your nose or gums  · You bruise easily  · You have questions or concerns about your condition or care  Treatment  will depend on the type of autoimmune disease you have  You may need any of the following:  · Medicines  may be given to replace thyroid hormones, insulin, or other hormones your body is not producing  Medicines may also reduce your immune system's ability to attack healthy cells or decrease inflammation in muscles or joints   You may need to use topical creams or lotions to control a rash or other symptoms that affect your skin     · Prescription pain medicine  may be given  Ask your healthcare provider how to take this medicine safely  · NSAIDs , such as ibuprofen, help decrease swelling, pain, and fever  This medicine is available with or without a doctor's order  NSAIDs can cause stomach bleeding or kidney problems in certain people  If you take blood thinner medicine, always ask your healthcare provider if NSAIDs are safe for you  Always read the medicine label and follow directions  · Acetaminophen  reduces pain and fever  This medicine is available without a doctor's order  Ask how much to take and how often to take it  Follow directions  Acetaminophen can cause liver damage if not taken correctly  · Steroids  help reduce swelling and relieve pain  · A blood transfusion  may be needed if your blood is affected  Manage an autoimmune disease:   · Rest as needed  Talk to your healthcare provider if you are having trouble sleeping because of pain or other symptoms  Rest your joints if they are stiff or painful  Your healthcare provider may suggest support devices such as crutches or splints to help your joints rest      · Eat a variety of healthy foods  Healthy foods include fruits, vegetables, lean meats, fish, and low-fat dairy products  Work with your healthcare provider or dietitian to create healthy meal plans  You may need to stop eating certain foods if your digestive system is affected by your autoimmune disease  · Go to physical or occupational therapy as directed  A physical therapist can help you create an exercise plan  Exercise may help increase your energy  Exercise can also help keep stiff joints flexible and increase range of motion  An occupational therapist can help you learn to do your daily activities when you have pain or swelling during a flare  · Do not smoke  Nicotine can damage blood vessels and make it more difficult to manage your symptoms   Ask your healthcare provider for information if you currently smoke and need help to quit  E-cigarettes or smokeless tobacco still contain nicotine  Talk to your healthcare provider before you use these products  · Talk to your healthcare provider about pregnancy  If you are a woman and want to get pregnant, talk to your healthcare provider  You or your baby might be at risk for complications  You may need to wait until your disease is controlled or your medications are finished before you get pregnant  You may also have trouble getting pregnant because of your disease  Your healthcare provider may be able to suggest ways to improve your ability to become pregnant  · Manage stress  Stress may slow healing and lead to illness  Learn ways to control stress, such as relaxation, deep breathing, or listening to music  Manage flares:  A flare means something triggered your symptoms  Stress, cold weather, and sunlight are examples of triggers  Your healthcare provider can help you create a management plan that includes what to do if you have a flare  Treat flares quickly to help prevent serious illness  · Apply ice or heat as directed  Ice helps reduce pain and swelling, and may help prevent tissue damage  Use a cold compress, or put crushed ice in a bag  Cover it with a towel and apply to the painful area for 15 to 20 minutes every hour, or as directed  Heat helps reduce pain and muscle spasms  Apply a warm compress to the area for 20 minutes every 2 hours, or as directed  · Elevate the area above the level of your heart  Elevation can help reduce swelling and pain, especially in your joints  Elevate the area as often as possible  Follow up with your healthcare provider as directed: You may need ongoing tests or treatment  Write down your questions so you remember to ask them during your visits    © 2017 [employer] Information is for End User's use only and may not be sold, redistributed or otherwise used for commercial purposes  All illustrations and images included in CareNotes® are the copyrighted property of [employer]  or [employer]  The above information is an  only  It is not intended as medical advice for individual conditions or treatments  Talk to your doctor, nurse or pharmacist before following any medical regimen to see if it is safe and effective for you

## 2018-07-24 ENCOUNTER — OFFICE VISIT (OUTPATIENT)
Dept: FAMILY MEDICINE CLINIC | Facility: CLINIC | Age: 48
End: 2018-07-24
Payer: COMMERCIAL

## 2018-07-24 VITALS
DIASTOLIC BLOOD PRESSURE: 88 MMHG | WEIGHT: 199 LBS | HEART RATE: 76 BPM | SYSTOLIC BLOOD PRESSURE: 150 MMHG | BODY MASS INDEX: 33.15 KG/M2 | TEMPERATURE: 97.9 F | HEIGHT: 65 IN | RESPIRATION RATE: 16 BRPM

## 2018-07-24 DIAGNOSIS — I10 ESSENTIAL HYPERTENSION: Primary | ICD-10-CM

## 2018-07-24 PROCEDURE — 1036F TOBACCO NON-USER: CPT | Performed by: NURSE PRACTITIONER

## 2018-07-24 PROCEDURE — 99213 OFFICE O/P EST LOW 20 MIN: CPT | Performed by: NURSE PRACTITIONER

## 2018-07-24 PROCEDURE — 3008F BODY MASS INDEX DOCD: CPT | Performed by: NURSE PRACTITIONER

## 2018-07-24 RX ORDER — LOSARTAN POTASSIUM 25 MG/1
25 TABLET ORAL DAILY
Qty: 30 TABLET | Refills: 5 | Status: SHIPPED | OUTPATIENT
Start: 2018-07-24 | End: 2020-09-08

## 2018-07-24 NOTE — PROGRESS NOTES
Assessment/Plan:    Problem List Items Addressed This Visit     Essential hypertension - Primary    Relevant Medications    losartan (COZAAR) 25 mg tablet        Patient's blood pressure is not controlled  Cardiovascular risk factors include: none  Current plan of care: medication changes per orders, dietary sodium restriction, regular aerobic exercise and weight loss  Reviewed risks of hypertension and principles of treatment  Blood pressure goal <140/90  Only abn thyroid lab is   She is asymptomatic  Recheck levels in 12weeks  Hold off on meds for now unless she becomes symptomatic  Patient Instructions     Chronic Hypertension   WHAT YOU NEED TO KNOW:   Hypertension is high blood pressure (BP)  Your BP is the force of your blood moving against the walls of your arteries  Normal BP is less than 120/80  Prehypertension is between 120/80 and 139/89  Hypertension is 140/90 or higher  Hypertension causes your BP to get so high that your heart has to work much harder than normal  This can damage your heart  Chronic hypertension is a long-term condition that you can control with a healthy lifestyle or medicines  A controlled blood pressure helps protect your organs, such as your heart, lungs, brain, and kidneys  DISCHARGE INSTRUCTIONS:   Call 911 for any of the following:   · You have discomfort in your chest that feels like squeezing, pressure, fullness, or pain  · You become confused or have difficulty speaking  · You suddenly feel lightheaded or have trouble breathing  · You have pain or discomfort in your back, neck, jaw, stomach, or arm  Return to the emergency department if:   · You have a severe headache or vision loss  · You have weakness in an arm or leg  Contact your healthcare provider if:   · You feel faint, dizzy, confused, or drowsy  · You have been taking your BP medicine and your BP is still higher than your healthcare provider says it should be      · You have questions or concerns about your condition or care  Medicines: You may need any of the following:  · Medicine  may be used to help lower your BP  You may need more than one type of medicine  Take the medicine exactly as directed  · Diuretics  help decrease extra fluid that collects in your body  This will help lower your BP  You may urinate more often while you take this medicine  · Cholesterol medicine  helps lower your cholesterol level  A low cholesterol level helps prevent heart disease and makes it easier to control your blood pressure  · Take your medicine as directed  Contact your healthcare provider if you think your medicine is not helping or if you have side effects  Tell him or her if you are allergic to any medicine  Keep a list of the medicines, vitamins, and herbs you take  Include the amounts, and when and why you take them  Bring the list or the pill bottles to follow-up visits  Carry your medicine list with you in case of an emergency  Follow up with your healthcare provider as directed: You will need to return to have your blood pressure checked and to have other lab tests done  Write down your questions so you remember to ask them during your visits  Manage chronic hypertension:  Talk with your healthcare provider about these and other ways to manage hypertension:  · Take your BP at home  Sit and rest for 5 minutes before you take your BP  Extend your arm and support it on a flat surface  Your arm should be at the same level as your heart  Follow the directions that came with your BP monitor  If possible, take at least 2 BP readings each time  Take your BP at least twice a day at the same times each day, such as morning and evening  Keep a record of your BP readings and bring it to your follow-up visits  Ask your healthcare provider what your blood pressure should be  · Limit sodium (salt) as directed  Too much sodium can affect your fluid balance   Check labels to find low-sodium or no-salt-added foods  Some low-sodium foods use potassium salts for flavor  Too much potassium can also cause health problems  Your healthcare provider will tell you how much sodium and potassium are safe for you to have in a day  He or she may recommend that you limit sodium to 2,300 mg a day  · Follow the meal plan recommended by your healthcare provider  A dietitian or your provider can give you more information on low-sodium plans or the DASH (Dietary Approaches to Stop Hypertension) eating plan  The DASH plan is low in sodium, unhealthy fats, and total fat  It is high in potassium, calcium, and fiber  · Exercise to maintain a healthy weight  Exercise at least 30 minutes per day, on most days of the week  This will help decrease your blood pressure  Ask about the best exercise plan for you  · Decrease stress  This may help lower your BP  Learn ways to relax, such as deep breathing or listening to music  · Limit alcohol  Women should limit alcohol to 1 drink a day  Men should limit alcohol to 2 drinks a day  A drink of alcohol is 12 ounces of beer, 5 ounces of wine, or 1½ ounces of liquor  · Do not smoke  Nicotine and other chemicals in cigarettes and cigars can increase your BP and also cause lung damage  Ask your healthcare provider for information if you currently smoke and need help to quit  E-cigarettes or smokeless tobacco still contain nicotine  Talk to your healthcare provider before you use these products  © 2017 [employer] Information is for End User's use only and may not be sold, redistributed or otherwise used for commercial purposes  All illustrations and images included in CareNotes® are the copyrighted property of [employer]  or [employer]  The above information is an  only  It is not intended as medical advice for individual conditions or treatments   Talk to your doctor, nurse or pharmacist before following any medical regimen to see if it is safe and effective for you  Return in about 3 months (around 10/24/2018)  Subjective:      Patient ID: Stanley France is a 50 y o  female  Chief Complaint   Patient presents with    Blood Pressure Check     review thyroid       Honey is here for bp check    She is essentially asymptomatic aside from severe PMS sxs    Was seen by gyn on 7/11/18 for OCP check     BP was significantly elevated  178/102    She has never had bp problems    Blood work was drawn which revealed elevated TPO  TSH 2 4  T3 126, T4 9 8  Lipids borderline, cbc, cmp wnl    Denies hx of thyroid problems    Thyroid u/s showed nodules        The following portions of the patient's history were reviewed and updated as appropriate: allergies, current medications, past family history, past medical history, past social history, past surgical history and problem list     Review of Systems   Constitutional: Negative  Eyes: Negative for visual disturbance  Respiratory: Negative  Cardiovascular: Negative  Endocrine: Negative  Genitourinary: Positive for menstrual problem  Neurological: Negative  Psychiatric/Behavioral: Negative  Current Outpatient Prescriptions   Medication Sig Dispense Refill    losartan (COZAAR) 25 mg tablet Take 1 tablet (25 mg total) by mouth daily 30 tablet 5     No current facility-administered medications for this visit  Objective:    /84 (BP Location: Left arm, Patient Position: Sitting, Cuff Size: Adult)   Pulse 76   Temp 97 9 °F (36 6 °C) (Temporal)   Resp 16   Ht 5' 5" (1 651 m)   Wt 90 3 kg (199 lb)   BMI 33 12 kg/m²        Physical Exam   Constitutional: She is oriented to person, place, and time  Vital signs are normal  She appears well-developed and well-nourished  No distress  HENT:   Mouth/Throat: Oropharynx is clear and moist    Neck: Thyromegaly present     Cardiovascular: Normal rate, regular rhythm, normal heart sounds and intact distal pulses  Pulmonary/Chest: Effort normal and breath sounds normal  No respiratory distress  Musculoskeletal: She exhibits no edema  Neurological: She is alert and oriented to person, place, and time  No cranial nerve deficit  Coordination normal    Skin: Skin is warm and dry  Psychiatric: She has a normal mood and affect  Her behavior is normal    Nursing note and vitals reviewed               Priya Marvin, [address] [place] [address]

## 2018-07-24 NOTE — PATIENT INSTRUCTIONS

## 2019-05-01 ENCOUNTER — OFFICE VISIT (OUTPATIENT)
Dept: AUDIOLOGY | Facility: CLINIC | Age: 49
End: 2019-05-01

## 2019-05-01 DIAGNOSIS — H90.3 SENSORINEURAL HEARING LOSS, BILATERAL: Primary | ICD-10-CM

## 2019-07-17 ENCOUNTER — TELEPHONE (OUTPATIENT)
Dept: FAMILY MEDICINE CLINIC | Facility: CLINIC | Age: 49
End: 2019-07-17

## 2019-07-17 NOTE — TELEPHONE ENCOUNTER
----- Message from Christian Diaz sent at 7/17/2019  2:00 PM EDT -----  Regarding: [facility] Pt Needs Appt  Hi - This [facility] pt is overdue for an office visit/BP check (she was told to follow up in 3 months last July 2018)  She has many open gaps in care  I know appt availability is very limited at [facility] - any time in 2019 is fine  Please outreach to schedule an appt and route this message back to [facility] when appt is scheduled  Thank you

## 2019-07-31 ENCOUNTER — OFFICE VISIT (OUTPATIENT)
Dept: FAMILY MEDICINE CLINIC | Facility: CLINIC | Age: 49
End: 2019-07-31
Payer: COMMERCIAL

## 2019-07-31 ENCOUNTER — TELEPHONE (OUTPATIENT)
Dept: FAMILY MEDICINE CLINIC | Facility: CLINIC | Age: 49
End: 2019-07-31

## 2019-07-31 VITALS
HEART RATE: 78 BPM | HEIGHT: 65 IN | TEMPERATURE: 98.1 F | RESPIRATION RATE: 16 BRPM | SYSTOLIC BLOOD PRESSURE: 130 MMHG | WEIGHT: 196.4 LBS | DIASTOLIC BLOOD PRESSURE: 88 MMHG | BODY MASS INDEX: 32.72 KG/M2

## 2019-07-31 DIAGNOSIS — I10 ESSENTIAL HYPERTENSION: Primary | ICD-10-CM

## 2019-07-31 DIAGNOSIS — Z13.0 SCREENING FOR DEFICIENCY ANEMIA: ICD-10-CM

## 2019-07-31 DIAGNOSIS — Z13.29 THYROID DISORDER SCREEN: ICD-10-CM

## 2019-07-31 DIAGNOSIS — Z13.220 LIPID SCREENING: ICD-10-CM

## 2019-07-31 DIAGNOSIS — Z13.1 DIABETES MELLITUS SCREENING: ICD-10-CM

## 2019-07-31 PROCEDURE — 3725F SCREEN DEPRESSION PERFORMED: CPT | Performed by: NURSE PRACTITIONER

## 2019-07-31 PROCEDURE — 99214 OFFICE O/P EST MOD 30 MIN: CPT | Performed by: NURSE PRACTITIONER

## 2019-07-31 PROCEDURE — 3008F BODY MASS INDEX DOCD: CPT | Performed by: NURSE PRACTITIONER

## 2019-07-31 PROCEDURE — 36415 COLL VENOUS BLD VENIPUNCTURE: CPT | Performed by: NURSE PRACTITIONER

## 2019-07-31 PROCEDURE — 3075F SYST BP GE 130 - 139MM HG: CPT | Performed by: NURSE PRACTITIONER

## 2019-07-31 PROCEDURE — 1036F TOBACCO NON-USER: CPT | Performed by: NURSE PRACTITIONER

## 2019-07-31 NOTE — PROGRESS NOTES
Assessment/Plan:    Problem List Items Addressed This Visit        Cardiovascular and Mediastinum    Essential hypertension - Primary    Relevant Orders    CBC and Platelet    Basic metabolic panel    TSH, 3rd generation with Free T4 reflex      Other Visit Diagnoses     Thyroid disorder screen        Relevant Orders    TSH, 3rd generation with Free T4 reflex    Lipid screening        Relevant Orders    Lipid panel    Screening for deficiency anemia        Relevant Orders    CBC and Platelet    Diabetes mellitus screening        Relevant Orders    Basic metabolic panel      The case discussed with patient using patient centered shared decision making  The patient was counseled regarding instructions for management,-- risk factor reductions,-- prognosis,-- impressions,-- risks and benefits of treatment options,-- importance of compliance with treatment  I have reviewed the instructions with the patient, answering all questions to her satisfaction  Patient's blood pressure is controlled  Cardiovascular risk factors include: none  Current plan of care: medication changes per orders, dietary sodium restriction, regular aerobic exercise and weight loss  Reviewed risks of hypertension and principles of treatment  Blood pressure goal <140/90  BMI Counseling: Body mass index is 32 68 kg/m²  Discussed the patient's BMI with her  The BMI is above average  BMI counseling and education was provided to the patient  Nutrition recommendations include reducing portion sizes, decreasing overall calorie intake, 3-5 servings of fruits/vegetables daily, reducing fast food intake and moderation in carbohydrate intake  Exercise recommendations include exercising 3-5 times per week  Patient clinically stable at this time  Cont with current plan of care     Labs drawn today-will call with results  RTO as recommended and PRN       Patient Instructions     Chronic Hypertension   WHAT YOU NEED TO KNOW:   Hypertension is high blood pressure (BP)  Your BP is the force of your blood moving against the walls of your arteries  Normal BP is less than 120/80  Prehypertension is between 120/80 and 139/89  Hypertension is 140/90 or higher  Hypertension causes your BP to get so high that your heart has to work much harder than normal  This can damage your heart  Chronic hypertension is a long-term condition that you can control with a healthy lifestyle or medicines  A controlled blood pressure helps protect your organs, such as your heart, lungs, brain, and kidneys  DISCHARGE INSTRUCTIONS:   Call 911 for any of the following:   · You have discomfort in your chest that feels like squeezing, pressure, fullness, or pain  · You become confused or have difficulty speaking  · You suddenly feel lightheaded or have trouble breathing  · You have pain or discomfort in your back, neck, jaw, stomach, or arm  Return to the emergency department if:   · You have a severe headache or vision loss  · You have weakness in an arm or leg  Contact your healthcare provider if:   · You feel faint, dizzy, confused, or drowsy  · You have been taking your BP medicine and your BP is still higher than your healthcare provider says it should be  · You have questions or concerns about your condition or care  Medicines: You may need any of the following:  · Medicine  may be used to help lower your BP  You may need more than one type of medicine  Take the medicine exactly as directed  · Diuretics  help decrease extra fluid that collects in your body  This will help lower your BP  You may urinate more often while you take this medicine  · Cholesterol medicine  helps lower your cholesterol level  A low cholesterol level helps prevent heart disease and makes it easier to control your blood pressure  · Take your medicine as directed  Contact your healthcare provider if you think your medicine is not helping or if you have side effects   Tell him or her if you are allergic to any medicine  Keep a list of the medicines, vitamins, and herbs you take  Include the amounts, and when and why you take them  Bring the list or the pill bottles to follow-up visits  Carry your medicine list with you in case of an emergency  Follow up with your healthcare provider as directed: You will need to return to have your blood pressure checked and to have other lab tests done  Write down your questions so you remember to ask them during your visits  Manage chronic hypertension:  Talk with your healthcare provider about these and other ways to manage hypertension:  · Take your BP at home  Sit and rest for 5 minutes before you take your BP  Extend your arm and support it on a flat surface  Your arm should be at the same level as your heart  Follow the directions that came with your BP monitor  If possible, take at least 2 BP readings each time  Take your BP at least twice a day at the same times each day, such as morning and evening  Keep a record of your BP readings and bring it to your follow-up visits  Ask your healthcare provider what your blood pressure should be  · Limit sodium (salt) as directed  Too much sodium can affect your fluid balance  Check labels to find low-sodium or no-salt-added foods  Some low-sodium foods use potassium salts for flavor  Too much potassium can also cause health problems  Your healthcare provider will tell you how much sodium and potassium are safe for you to have in a day  He or she may recommend that you limit sodium to 2,300 mg a day  · Follow the meal plan recommended by your healthcare provider  A dietitian or your provider can give you more information on low-sodium plans or the DASH (Dietary Approaches to Stop Hypertension) eating plan  The DASH plan is low in sodium, unhealthy fats, and total fat  It is high in potassium, calcium, and fiber  · Exercise to maintain a healthy weight    Exercise at least 30 minutes per day, on most days of the week  This will help decrease your blood pressure  Ask about the best exercise plan for you  · Decrease stress  This may help lower your BP  Learn ways to relax, such as deep breathing or listening to music  · Limit alcohol  Women should limit alcohol to 1 drink a day  Men should limit alcohol to 2 drinks a day  A drink of alcohol is 12 ounces of beer, 5 ounces of wine, or 1½ ounces of liquor  · Do not smoke  Nicotine and other chemicals in cigarettes and cigars can increase your BP and also cause lung damage  Ask your healthcare provider for information if you currently smoke and need help to quit  E-cigarettes or smokeless tobacco still contain nicotine  Talk to your healthcare provider before you use these products  © 2017 [facility] Information is for End User's use only and may not be sold, redistributed or otherwise used for commercial purposes  All illustrations and images included in CareNotes® are the copyrighted property of A D A M , Inc  or [employer]  The above information is an  only  It is not intended as medical advice for individual conditions or treatments  Talk to your doctor, nurse or pharmacist before following any medical regimen to see if it is safe and effective for you  Return in about 6 months (around 1/31/2020)  Subjective:      Patient ID: Stanley France is a 52 y o  female  Chief Complaint   Patient presents with    Follow-up     BP check       Honey is here for bp check    She is doing very well    In usual state of health    No c/o today    Gyn care utd    Due for labs      The following portions of the patient's history were reviewed and updated as appropriate: allergies, current medications, past family history, past medical history, past social history, past surgical history and problem list     Review of Systems   Constitutional: Negative      Eyes: Negative for visual disturbance  Respiratory: Negative  Cardiovascular: Negative  Gastrointestinal: Negative  Endocrine: Negative  Neurological: Negative  Psychiatric/Behavioral: Negative  Current Outpatient Medications   Medication Sig Dispense Refill    losartan (COZAAR) 25 mg tablet Take 1 tablet (25 mg total) by mouth daily 30 tablet 5     No current facility-administered medications for this visit  Objective:    /88 (BP Location: Left arm, Patient Position: Sitting, Cuff Size: Large)   Pulse 78   Temp 98 1 °F (36 7 °C)   Resp 16   Ht 5' 5" (1 651 m)   Wt 89 1 kg (196 lb 6 4 oz)   BMI 32 68 kg/m²        Physical Exam   Constitutional: She is oriented to person, place, and time  Vital signs are normal  She appears well-developed and well-nourished  No distress  HENT:   Head: Normocephalic and atraumatic  Mouth/Throat: Oropharynx is clear and moist    Neck: Normal range of motion  Neck supple  No thyromegaly present  Cardiovascular: Normal rate, regular rhythm, normal heart sounds and intact distal pulses  Pulmonary/Chest: Effort normal and breath sounds normal  No respiratory distress  Abdominal: Soft  Bowel sounds are normal  There is no tenderness  Musculoskeletal: She exhibits no edema  Lymphadenopathy:     She has no cervical adenopathy  Neurological: She is alert and oriented to person, place, and time  Coordination normal    Skin: Skin is warm and dry  No pallor  Psychiatric: She has a normal mood and affect  Her behavior is normal    Nursing note and vitals reviewed               Lana Taylor, [facility]

## 2019-08-01 ENCOUNTER — TELEPHONE (OUTPATIENT)
Dept: FAMILY MEDICINE CLINIC | Facility: CLINIC | Age: 49
End: 2019-08-01

## 2019-08-01 LAB
BUN SERPL-MCNC: 10 MG/DL (ref 6–24)
BUN/CREAT SERPL: 13 (ref 9–23)
CALCIUM SERPL-MCNC: 9.2 MG/DL (ref 8.7–10.2)
CHLORIDE SERPL-SCNC: 106 MMOL/L (ref 96–106)
CHOLEST SERPL-MCNC: 208 MG/DL (ref 100–199)
CO2 SERPL-SCNC: 22 MMOL/L (ref 20–29)
CREAT SERPL-MCNC: 0.8 MG/DL (ref 0.57–1)
ERYTHROCYTE [DISTWIDTH] IN BLOOD BY AUTOMATED COUNT: 15.6 % (ref 12.3–15.4)
GLUCOSE SERPL-MCNC: 94 MG/DL (ref 65–99)
HCT VFR BLD AUTO: 39.8 % (ref 34–46.6)
HDLC SERPL-MCNC: 50 MG/DL
HGB BLD-MCNC: 13 G/DL (ref 11.1–15.9)
LDLC SERPL CALC-MCNC: 129 MG/DL (ref 0–99)
MCH RBC QN AUTO: 26.5 PG (ref 26.6–33)
MCHC RBC AUTO-ENTMCNC: 32.7 G/DL (ref 31.5–35.7)
MCV RBC AUTO: 81 FL (ref 79–97)
MICRODELETION SYND BLD/T FISH: NORMAL
PLATELET # BLD AUTO: 274 X10E3/UL (ref 150–450)
POTASSIUM SERPL-SCNC: 5.4 MMOL/L (ref 3.5–5.2)
RBC # BLD AUTO: 4.9 X10E6/UL (ref 3.77–5.28)
SL AMB EGFR AFRICAN AMERICAN: 100 ML/MIN/1.73
SL AMB EGFR NON AFRICAN AMERICAN: 87 ML/MIN/1.73
SL AMB VLDL CHOLESTEROL CALC: 29 MG/DL (ref 5–40)
SODIUM SERPL-SCNC: 140 MMOL/L (ref 134–144)
TRIGL SERPL-MCNC: 145 MG/DL (ref 0–149)
TSH SERPL DL<=0.005 MIU/L-ACNC: 2.58 UIU/ML (ref 0.45–4.5)
WBC # BLD AUTO: 5.9 X10E3/UL (ref 3.4–10.8)

## 2019-08-01 NOTE — TELEPHONE ENCOUNTER
----- Message from Jaden Santana, 10 Ervin Ray sent at 8/1/2019 10:49 AM EDT -----  Please advise patient that labs are acceptable  Cont plan of care and follow up as recommended

## 2019-08-02 NOTE — TELEPHONE ENCOUNTER
08/02/19 3:47 PM     Upon review of the In Basket request we were able to locate, review, and update the patient chart as requested  This message will now be closed      Thank you  Sukhwinder Best MA     ED Visit Information         [employer] [facility]

## 2019-11-13 ENCOUNTER — OFFICE VISIT (OUTPATIENT)
Dept: AUDIOLOGY | Facility: CLINIC | Age: 49
End: 2019-11-13
Payer: COMMERCIAL

## 2019-11-13 DIAGNOSIS — H90.3 SENSORINEURAL HEARING LOSS, BILATERAL: Primary | ICD-10-CM

## 2019-11-13 NOTE — PROGRESS NOTES
Hearing Aid Visit:    Name:  Katty Coto  :  1970  Age:  52 y o  Date of Evaluation: 19    Katty Coto is being seen for a routine hearing aid visit  Patient is fit with Yen [place] 2 Pro hearing aid(s) fit in 2016  Right serial number [id number]  Left serial number [id number]  Warranty date: 19 (Loss/Damage and repair)  Patient reports no current issues with devices  Action:  1  Cleaned and checked aids; found to be in good working order  2  Discussed warranty expiration and as she has not had issues with the devices, she decided not to renew  3  Provided current Price list for services out of warranty  Recommendations:   1  RTO in 6 months for hearing aid check  Consider audiological evaluation in Spring 2020        Miranda Larkin , [place], [place]# [id number], Hearing Aid Dispenser, [place]# [id number]  Clinical Audiologist

## 2019-11-13 NOTE — PROGRESS NOTES
Hearing Aid Visit:    Name:  Milagro Pepper  :  1970  Age:  52 y o  Date of Evaluation: 19     Milagro Pepper is being seen for a 6 month hearing aid visit  Patient is fit with [place] 2 Pro minirite hearing aid(s)  Right serial number [id number]  Left serial number [id number]  Warranty date: 19 OOW (Loss/Damage and repair)  Patient reports no current issues  Action:  1  Canals clear  2  Aids were found to be in good working order  3  She is maintaining the wax guards and domes well and is not in need of supplies  Recommendations:   1  Scheduled for 3/13/20 for 2 year audio and hearing aid service  Discussed need for script for the evaluation         Miranda Corbin , CCC-A, [place]# [id number], Hearing Aid Dispenser, [place] [id number]  Clinical Audiologist

## 2020-01-31 NOTE — TELEPHONE ENCOUNTER
Behavioral Health Consultation/Psychotherapy Note  Praneeth Knows. Augustus Ryan Psy.D. Visit Date:  1/31/2020    Patient:  Henry Kuo  YOB: 1986  Chief Complaint:  Follow-up; Depression; and Stress    Duration of session:  25 minutes      S:     Pt doing well overall, reporting feeling satisfied with his stability. However there was a little change in his tone in that he doesn't want to just be satisfied with being stable, but wants to improve himself at least to start by having more energy. We discussed some ways he can try to create positive mental and physical energy for himself. He's having a hard time getting started with any kind of movement plan. He does have aspirations to organize and conduct a D&D game and has started preparations about that. O:    Appearance    Patient presents as alert, oriented, and cooperative Appetite abnormal: overeating  Sleep disturbance No  Loss of pleasure Some  Speech    normal rate, normal volume, well articulated and clear and understandable  Mood    Dysthymic  [place]  Affect    depressed affect  Thought Process    linear, goal directed, coherent and linear and coherent  Insight    Fair  Judgment    Intact  Memory    recent and remote memory intact  Suicide Assessment    Denied SI, plans or intent    A:    1. Dysthymia    2. Major depressive disorder, recurrent episode, severe with anxious distress ([facility] [address]        Pt seems to be accepting that he will not get the disability he's seeking.  He's not benefitted from any of the CBT interventions to this point.  Pt's external locus of control makes it hard for him to take personal responsibility. Joaquim Johns seems to be part of his presentation with the MDD episodes coming and going.  Overall, pt is stable. P:    Return in 2-3 mos. All questions about treatment plan answered. Patient instructed to go immediately to the emergency room and/or call 911 if any suicidal or homicidal ideations.  Patient stated mammo per [address]  understanding and is agreeable to treatment and crisis plan.           Provider Signature:  Electronically signed by Balbir Walsh PSYD on 1/31/2020 at 11:39 AM

## 2020-09-07 DIAGNOSIS — I10 ESSENTIAL HYPERTENSION: ICD-10-CM

## 2020-09-08 RX ORDER — LOSARTAN POTASSIUM 25 MG/1
TABLET ORAL
Qty: 30 TABLET | Refills: 5 | Status: SHIPPED | OUTPATIENT
Start: 2020-09-08 | End: 2021-09-01 | Stop reason: SDUPTHER

## 2020-10-05 ENCOUNTER — TELEPHONE (OUTPATIENT)
Dept: FAMILY MEDICINE CLINIC | Facility: CLINIC | Age: 50
End: 2020-10-05

## 2020-10-20 ENCOUNTER — TELEPHONE (OUTPATIENT)
Dept: FAMILY MEDICINE CLINIC | Facility: CLINIC | Age: 50
End: 2020-10-20

## 2020-10-20 ENCOUNTER — OFFICE VISIT (OUTPATIENT)
Dept: AUDIOLOGY | Facility: CLINIC | Age: 50
End: 2020-10-20
Payer: COMMERCIAL

## 2020-10-20 DIAGNOSIS — H90.3 SENSORY HEARING LOSS, BILATERAL: Primary | ICD-10-CM

## 2020-10-20 DIAGNOSIS — H91.90 DECREASED HEARING, UNSPECIFIED LATERALITY: Primary | ICD-10-CM

## 2020-10-20 PROCEDURE — 92567 TYMPANOMETRY: CPT | Performed by: AUDIOLOGIST

## 2020-10-20 PROCEDURE — 92557 COMPREHENSIVE HEARING TEST: CPT | Performed by: AUDIOLOGIST

## 2020-10-29 ENCOUNTER — OFFICE VISIT (OUTPATIENT)
Dept: FAMILY MEDICINE CLINIC | Facility: CLINIC | Age: 50
End: 2020-10-29
Payer: COMMERCIAL

## 2020-10-29 VITALS
BODY MASS INDEX: 31.16 KG/M2 | RESPIRATION RATE: 16 BRPM | WEIGHT: 187 LBS | SYSTOLIC BLOOD PRESSURE: 128 MMHG | HEIGHT: 65 IN | TEMPERATURE: 97.9 F | DIASTOLIC BLOOD PRESSURE: 68 MMHG | OXYGEN SATURATION: 98 % | HEART RATE: 72 BPM

## 2020-10-29 DIAGNOSIS — Z13.1 SCREENING FOR DIABETES MELLITUS: ICD-10-CM

## 2020-10-29 DIAGNOSIS — Z13.220 LIPID SCREENING: ICD-10-CM

## 2020-10-29 DIAGNOSIS — Z12.11 COLON CANCER SCREENING: ICD-10-CM

## 2020-10-29 DIAGNOSIS — F32.81 PMDD (PREMENSTRUAL DYSPHORIC DISORDER): ICD-10-CM

## 2020-10-29 DIAGNOSIS — Z13.0 SCREENING, DEFICIENCY ANEMIA, IRON: ICD-10-CM

## 2020-10-29 DIAGNOSIS — R94.6 THYROID FUNCTION TEST ABNORMAL: ICD-10-CM

## 2020-10-29 DIAGNOSIS — I10 ESSENTIAL HYPERTENSION: ICD-10-CM

## 2020-10-29 DIAGNOSIS — Z00.00 HEALTH CARE MAINTENANCE: Primary | ICD-10-CM

## 2020-10-29 PROCEDURE — 99396 PREV VISIT EST AGE 40-64: CPT | Performed by: NURSE PRACTITIONER

## 2020-10-29 PROCEDURE — 36415 COLL VENOUS BLD VENIPUNCTURE: CPT | Performed by: NURSE PRACTITIONER

## 2020-10-29 RX ORDER — SERTRALINE HYDROCHLORIDE 25 MG/1
TABLET, FILM COATED ORAL
Qty: 42 TABLET | Refills: 3 | Status: SHIPPED | OUTPATIENT
Start: 2020-10-29 | End: 2022-05-16 | Stop reason: ALTCHOICE

## 2020-10-30 ENCOUNTER — TELEPHONE (OUTPATIENT)
Dept: ADMINISTRATIVE | Facility: OTHER | Age: 50
End: 2020-10-30

## 2020-11-02 ENCOUNTER — DOCUMENTATION (OUTPATIENT)
Dept: AUDIOLOGY | Facility: CLINIC | Age: 50
End: 2020-11-02

## 2020-11-02 DIAGNOSIS — H90.3 SENSORY HEARING LOSS, BILATERAL: Primary | ICD-10-CM

## 2020-11-02 LAB
BUN SERPL-MCNC: 13 MG/DL (ref 6–24)
BUN/CREAT SERPL: 16 (ref 9–23)
CALCIUM SERPL-MCNC: 8.8 MG/DL (ref 8.7–10.2)
CHLORIDE SERPL-SCNC: 108 MMOL/L (ref 96–106)
CHOLEST SERPL-MCNC: 177 MG/DL (ref 100–199)
CO2 SERPL-SCNC: 24 MMOL/L (ref 20–29)
CREAT SERPL-MCNC: 0.82 MG/DL (ref 0.57–1)
ERYTHROCYTE [DISTWIDTH] IN BLOOD BY AUTOMATED COUNT: 14.6 % (ref 11.7–15.4)
GLUCOSE SERPL-MCNC: 98 MG/DL (ref 65–99)
HCT VFR BLD AUTO: 38 % (ref 34–46.6)
HDLC SERPL-MCNC: 46 MG/DL
HGB BLD-MCNC: 11.7 G/DL (ref 11.1–15.9)
LDLC SERPL CALC-MCNC: 103 MG/DL (ref 0–99)
MCH RBC QN AUTO: 26.4 PG (ref 26.6–33)
MCHC RBC AUTO-ENTMCNC: 30.8 G/DL (ref 31.5–35.7)
MCV RBC AUTO: 86 FL (ref 79–97)
MICRODELETION SYND BLD/T FISH: NORMAL
PLATELET # BLD AUTO: 335 X10E3/UL (ref 150–450)
POTASSIUM SERPL-SCNC: 4.8 MMOL/L (ref 3.5–5.2)
RBC # BLD AUTO: 4.44 X10E6/UL (ref 3.77–5.28)
SL AMB EGFR AFRICAN AMERICAN: 96 ML/MIN/1.73
SL AMB EGFR NON AFRICAN AMERICAN: 84 ML/MIN/1.73
SL AMB VLDL CHOLESTEROL CALC: 28 MG/DL (ref 5–40)
SODIUM SERPL-SCNC: 142 MMOL/L (ref 134–144)
T3FREE SERPL-MCNC: 2.5 PG/ML (ref 2–4.4)
TRIGL SERPL-MCNC: 161 MG/DL (ref 0–149)
TSH SERPL DL<=0.005 MIU/L-ACNC: 2.19 UIU/ML (ref 0.45–4.5)
WBC # BLD AUTO: 5.7 X10E3/UL (ref 3.4–10.8)

## 2020-11-13 ENCOUNTER — TELEMEDICINE (OUTPATIENT)
Dept: FAMILY MEDICINE CLINIC | Facility: CLINIC | Age: 50
End: 2020-11-13
Payer: COMMERCIAL

## 2020-11-13 DIAGNOSIS — Z11.9 ENCOUNTER FOR SCREENING FOR INFECTIOUS AND PARASITIC DISEASES, UNSPECIFIED: Primary | ICD-10-CM

## 2020-11-13 DIAGNOSIS — Z11.9 ENCOUNTER FOR SCREENING FOR INFECTIOUS AND PARASITIC DISEASES, UNSPECIFIED: ICD-10-CM

## 2020-11-13 DIAGNOSIS — R68.89 FEELING UNWELL: ICD-10-CM

## 2020-11-13 PROCEDURE — 1036F TOBACCO NON-USER: CPT | Performed by: NURSE PRACTITIONER

## 2020-11-13 PROCEDURE — U0003 INFECTIOUS AGENT DETECTION BY NUCLEIC ACID (DNA OR RNA); SEVERE ACUTE RESPIRATORY SYNDROME CORONAVIRUS 2 (SARS-COV-2) (CORONAVIRUS DISEASE [COVID-19]), AMPLIFIED PROBE TECHNIQUE, MAKING USE OF HIGH THROUGHPUT TECHNOLOGIES AS DESCRIBED BY CMS-2020-01-R: HCPCS | Performed by: NURSE PRACTITIONER

## 2020-11-13 PROCEDURE — 99213 OFFICE O/P EST LOW 20 MIN: CPT | Performed by: NURSE PRACTITIONER

## 2020-11-15 LAB — SARS-COV-2 RNA SPEC QL NAA+PROBE: NOT DETECTED

## 2020-11-16 ENCOUNTER — TELEPHONE (OUTPATIENT)
Dept: FAMILY MEDICINE CLINIC | Facility: CLINIC | Age: 50
End: 2020-11-16

## 2020-11-17 DIAGNOSIS — J01.00 ACUTE NON-RECURRENT MAXILLARY SINUSITIS: Primary | ICD-10-CM

## 2020-11-17 RX ORDER — AZITHROMYCIN 250 MG/1
TABLET, FILM COATED ORAL
Qty: 6 TABLET | Refills: 0 | Status: SHIPPED | OUTPATIENT
Start: 2020-11-17 | End: 2020-11-21

## 2020-11-25 ENCOUNTER — OFFICE VISIT (OUTPATIENT)
Dept: AUDIOLOGY | Facility: CLINIC | Age: 50
End: 2020-11-25
Payer: COMMERCIAL

## 2020-11-25 DIAGNOSIS — H90.3 SENSORY HEARING LOSS, BILATERAL: Primary | ICD-10-CM

## 2020-11-28 ENCOUNTER — OFFICE VISIT (OUTPATIENT)
Dept: URGENT CARE | Facility: CLINIC | Age: 50
End: 2020-11-28
Payer: COMMERCIAL

## 2020-11-28 VITALS
WEIGHT: 186 LBS | HEIGHT: 65 IN | TEMPERATURE: 98 F | DIASTOLIC BLOOD PRESSURE: 91 MMHG | OXYGEN SATURATION: 96 % | BODY MASS INDEX: 30.99 KG/M2 | RESPIRATION RATE: 18 BRPM | HEART RATE: 86 BPM | SYSTOLIC BLOOD PRESSURE: 135 MMHG

## 2020-11-28 DIAGNOSIS — J01.90 ACUTE NON-RECURRENT SINUSITIS, UNSPECIFIED LOCATION: Primary | ICD-10-CM

## 2020-11-28 PROCEDURE — 99213 OFFICE O/P EST LOW 20 MIN: CPT | Performed by: PHYSICIAN ASSISTANT

## 2020-11-28 PROCEDURE — 3008F BODY MASS INDEX DOCD: CPT | Performed by: NURSE PRACTITIONER

## 2020-11-28 RX ORDER — METHYLPREDNISOLONE 4 MG/1
TABLET ORAL
Qty: 1 EACH | Refills: 0 | Status: SHIPPED | OUTPATIENT
Start: 2020-11-28 | End: 2022-05-16 | Stop reason: ALTCHOICE

## 2021-03-23 ENCOUNTER — OFFICE VISIT (OUTPATIENT)
Dept: AUDIOLOGY | Facility: CLINIC | Age: 51
End: 2021-03-23

## 2021-03-23 DIAGNOSIS — H90.3 SENSORY HEARING LOSS, BILATERAL: Primary | ICD-10-CM

## 2021-03-24 NOTE — PROGRESS NOTES
Hearing Aid Visit:    Name:  Negin Tran  :  1970  Age:  48 y o  Date of Evaluation: 3/23/2021    Negin Tran is being seen for a hearing aid visit DROP OFF AIDS      Patient is fit with Miguel [place] 2 Pro hearing aid(s) fit in 2016  Right serial number [id number]  Left serial number [id number]  Warranty date: OOW 19 (Loss/Damage and repair)  (Left aid repair only [employer] )    Patient reports the right push button does not work  Action:  1  Returning to lab for repair Meenakshi Fairchild) / will need to connect aids when they return  Recommendations:   1   Call patient when ready      Miranda Sherman , CCC-A, [place]# [id number], Hearing Aid Dispenser, [place]# [id number]  Clinical Audiologist

## 2021-04-05 ENCOUNTER — RA CDI HCC (OUTPATIENT)
Dept: OTHER | Facility: HOSPITAL | Age: 51
End: 2021-04-05

## 2021-04-05 NOTE — PROGRESS NOTES
[facility]  coding oppertunities          Chart reviewed, no opportunity found: CHART REVIEWED, NO OPPORTUNITY FOUND

## 2021-04-06 ENCOUNTER — TELEPHONE (OUTPATIENT)
Dept: FAMILY MEDICINE CLINIC | Facility: CLINIC | Age: 51
End: 2021-04-06

## 2021-04-13 ENCOUNTER — OFFICE VISIT (OUTPATIENT)
Dept: AUDIOLOGY | Facility: CLINIC | Age: 51
End: 2021-04-13
Payer: COMMERCIAL

## 2021-04-13 DIAGNOSIS — Z23 ENCOUNTER FOR IMMUNIZATION: ICD-10-CM

## 2021-04-13 DIAGNOSIS — H90.3 SENSORY HEARING LOSS, BILATERAL: Primary | ICD-10-CM

## 2021-04-13 NOTE — PROGRESS NOTES
Progress Note    Name:  Ofe Granados  :  1970  Age:  46 y o    Date of Evaluation: 21     Patient picked up her repaired R hearing aid / had been programmed with the left on 21(see [place])      Miranda Khan , CCC-A, [place]# [id number], Hearing Aid Dispenser, [place]# [id number]  Clinical Audiologist

## 2021-09-08 ENCOUNTER — RA CDI HCC (OUTPATIENT)
Dept: OTHER | Facility: HOSPITAL | Age: 51
End: 2021-09-08

## 2021-09-08 NOTE — PROGRESS NOTES
[facility]  coding opportunities       Chart reviewed, no opportunity found: CHART REVIEWED, NO OPPORTUNITY FOUND                        Patients insurance company: [employer] (Medicare and Commercial for [employer] and [employer])

## 2021-09-17 ENCOUNTER — VBI (OUTPATIENT)
Dept: ADMINISTRATIVE | Facility: OTHER | Age: 51
End: 2021-09-17

## 2021-09-17 ENCOUNTER — RA CDI HCC (OUTPATIENT)
Dept: OTHER | Facility: HOSPITAL | Age: 51
End: 2021-09-17

## 2021-09-17 NOTE — PROGRESS NOTES
[facility]  coding opportunities       Chart reviewed, no opportunity found: CHART REVIEWED, NO OPPORTUNITY FOUND                        Patients insurance company: [employer] (Medicare and Commercial for [employer] and [employer])

## 2021-10-01 ENCOUNTER — OFFICE VISIT (OUTPATIENT)
Dept: FAMILY MEDICINE CLINIC | Facility: CLINIC | Age: 51
End: 2021-10-01
Payer: COMMERCIAL

## 2021-10-01 VITALS
RESPIRATION RATE: 18 BRPM | OXYGEN SATURATION: 98 % | BODY MASS INDEX: 32.02 KG/M2 | HEART RATE: 88 BPM | WEIGHT: 192.2 LBS | SYSTOLIC BLOOD PRESSURE: 128 MMHG | DIASTOLIC BLOOD PRESSURE: 84 MMHG | HEIGHT: 65 IN | TEMPERATURE: 98.1 F

## 2021-10-01 DIAGNOSIS — Z13.220 LIPID SCREENING: ICD-10-CM

## 2021-10-01 DIAGNOSIS — Z13.0 SCREENING, DEFICIENCY ANEMIA, IRON: ICD-10-CM

## 2021-10-01 DIAGNOSIS — I10 ESSENTIAL HYPERTENSION: Primary | ICD-10-CM

## 2021-10-01 DIAGNOSIS — R94.6 THYROID FUNCTION TEST ABNORMAL: ICD-10-CM

## 2021-10-01 DIAGNOSIS — Z13.1 SCREENING FOR DIABETES MELLITUS: ICD-10-CM

## 2021-10-01 PROCEDURE — 1036F TOBACCO NON-USER: CPT | Performed by: NURSE PRACTITIONER

## 2021-10-01 PROCEDURE — 3008F BODY MASS INDEX DOCD: CPT | Performed by: NURSE PRACTITIONER

## 2021-10-01 PROCEDURE — 3725F SCREEN DEPRESSION PERFORMED: CPT | Performed by: NURSE PRACTITIONER

## 2021-10-01 PROCEDURE — 99213 OFFICE O/P EST LOW 20 MIN: CPT | Performed by: NURSE PRACTITIONER

## 2021-10-01 PROCEDURE — 36415 COLL VENOUS BLD VENIPUNCTURE: CPT | Performed by: NURSE PRACTITIONER

## 2021-10-04 ENCOUNTER — TELEPHONE (OUTPATIENT)
Dept: FAMILY MEDICINE CLINIC | Facility: CLINIC | Age: 51
End: 2021-10-04

## 2021-10-04 LAB
BUN SERPL-MCNC: 14 MG/DL (ref 6–24)
BUN/CREAT SERPL: 16 (ref 9–23)
CALCIUM SERPL-MCNC: 9.4 MG/DL (ref 8.7–10.2)
CHLORIDE SERPL-SCNC: 107 MMOL/L (ref 96–106)
CHOLEST SERPL-MCNC: 178 MG/DL (ref 100–199)
CO2 SERPL-SCNC: 27 MMOL/L (ref 20–29)
CREAT SERPL-MCNC: 0.89 MG/DL (ref 0.57–1)
ERYTHROCYTE [DISTWIDTH] IN BLOOD BY AUTOMATED COUNT: 13.9 % (ref 11.7–15.4)
GLUCOSE SERPL-MCNC: 88 MG/DL (ref 65–99)
HCT VFR BLD AUTO: 34.8 % (ref 34–46.6)
HDLC SERPL-MCNC: 47 MG/DL
HGB BLD-MCNC: 11.4 G/DL (ref 11.1–15.9)
LDLC SERPL CALC-MCNC: 106 MG/DL (ref 0–99)
MCH RBC QN AUTO: 27.7 PG (ref 26.6–33)
MCHC RBC AUTO-ENTMCNC: 32.8 G/DL (ref 31.5–35.7)
MCV RBC AUTO: 85 FL (ref 79–97)
MICRODELETION SYND BLD/T FISH: NORMAL
PLATELET # BLD AUTO: 295 X10E3/UL (ref 150–450)
POTASSIUM SERPL-SCNC: 4.7 MMOL/L (ref 3.5–5.2)
RBC # BLD AUTO: 4.11 X10E6/UL (ref 3.77–5.28)
SL AMB EGFR AFRICAN AMERICAN: 87 ML/MIN/1.73
SL AMB EGFR NON AFRICAN AMERICAN: 75 ML/MIN/1.73
SL AMB VLDL CHOLESTEROL CALC: 25 MG/DL (ref 5–40)
SODIUM SERPL-SCNC: 144 MMOL/L (ref 134–144)
TRIGL SERPL-MCNC: 142 MG/DL (ref 0–149)
TSH SERPL DL<=0.005 MIU/L-ACNC: 1.91 UIU/ML (ref 0.45–4.5)
WBC # BLD AUTO: 5.5 X10E3/UL (ref 3.4–10.8)

## 2022-05-12 ENCOUNTER — OFFICE VISIT (OUTPATIENT)
Dept: AUDIOLOGY | Facility: CLINIC | Age: 52
End: 2022-05-12
Payer: COMMERCIAL

## 2022-05-12 DIAGNOSIS — H90.3 SENSORY HEARING LOSS, BILATERAL: Primary | ICD-10-CM

## 2022-05-16 ENCOUNTER — OFFICE VISIT (OUTPATIENT)
Dept: OTOLARYNGOLOGY | Facility: CLINIC | Age: 52
End: 2022-05-16
Payer: COMMERCIAL

## 2022-05-16 VITALS — HEIGHT: 65 IN | TEMPERATURE: 98.4 F | BODY MASS INDEX: 31.65 KG/M2 | WEIGHT: 190 LBS

## 2022-05-16 DIAGNOSIS — J32.9 CHRONIC RHINOSINUSITIS: ICD-10-CM

## 2022-05-16 DIAGNOSIS — J34.2 DEVIATED NASAL SEPTUM: ICD-10-CM

## 2022-05-16 DIAGNOSIS — J31.0 CHRONIC RHINOSINUSITIS: ICD-10-CM

## 2022-05-16 DIAGNOSIS — J31.0 RHINITIS, CHRONIC: ICD-10-CM

## 2022-05-16 DIAGNOSIS — J34.3 HYPERTROPHY OF BOTH INFERIOR NASAL TURBINATES: Primary | ICD-10-CM

## 2022-05-16 PROCEDURE — 99203 OFFICE O/P NEW LOW 30 MIN: CPT | Performed by: STUDENT IN AN ORGANIZED HEALTH CARE EDUCATION/TRAINING PROGRAM

## 2022-05-16 PROCEDURE — 31231 NASAL ENDOSCOPY DX: CPT | Performed by: STUDENT IN AN ORGANIZED HEALTH CARE EDUCATION/TRAINING PROGRAM

## 2022-05-16 RX ORDER — TROSPIUM CHLORIDE ER 60 MG/1
CAPSULE ORAL
COMMUNITY
Start: 2022-04-12 | End: 2022-06-20 | Stop reason: ALTCHOICE

## 2022-05-16 NOTE — PROGRESS NOTES
[facility] [address] [facility]  Otolaryngology -- Head and Neck Surgery New Patient Visit  Shakila Bower is a 46 y o  who presents with a chief complaint of     Nose and sinuses: The patient is complaining o  Has left sided nasal blockage, chronic nasal drainage, post nasal drip, facial pressure and frequent throat clearing, however denied any history of sneezing, itchy eyes and nose or nasal bleeding  There is no history of nasal trauma or surgeries  Also no history of bronchial asthma, aspirin sensitivity  no recent CT scan sinuses  no allergy skin testing   Flonase does little help    Review of systems: Pertinent review of systems documented in the HPI  10 point ROS documented in a separate note, as necessary  Results reviewed; images from any scan have been personally reviewed: The past medical, surgical, social and family history have been reviewed as documented in today's record  Past Medical History:   Diagnosis Date    Hard of hearing     Tympanic membrane perforation      Past Surgical History:   Procedure Laterality Date    TUBAL LIGATION       Family History   Problem Relation Age of Onset    Cancer Mother     COPD Father     Cancer Father     Cancer Maternal Grandmother     COPD Paternal Aunt     Cancer Paternal [de-identified]     Breast cancer Paternal Aunt      Current Outpatient Medications on File Prior to Visit   Medication Sig Dispense Refill    losartan (COZAAR) 25 mg tablet Take 1 tablet (25 mg total) by mouth daily 90 tablet 1    [DISCONTINUED] methylPREDNISolone 4 MG tablet therapy pack Use as directed on package 1 each 0    [DISCONTINUED] sertraline (ZOLOFT) 25 mg tablet Start 7 days prior to menses  Take daily for 14 days each month as directed  42 tablet 3     No current facility-administered medications on file prior to visit        Physical exam:   Temp 98 4 °F (36 9 °C) (Temporal)   Ht 5' 5" (1 651 m)   Wt 86 2 kg (190 lb)   BMI 31 62 kg/m²   Head: Atraumatic, no visible scalp lesions, parotid and submandibular salivary glands non-tender to palpation and without masses bilaterally  Neck:  No visible or palpable cervical lesions or lymphadenopathy, thyroid gland is normal in size and symmetry and without masses, normal laryngeal elevation with swallowing  Ears:    Right ear :  Auricle normal in appearance, mastoid prominence non-tender, external auditory canal clear  Tympanic membranes intact  Left ear :  Auricle normal in appearance, mastoid prominence non-tender, external auditory canal clear   Tympanic membranes intact  Nose/Sinuses:  External appearance unremarkable, no maxillary or frontal sinus tenderness to palpation bilaterally  Nasal endoscopic examination showed deviated nasal septum, bilateral enlarged inferior turbinates, no polyps, thick mucus, middle, superior meatus and sphenoethmoid recess clear  Oral Cavity:  Moist mucus membranes, gums and dentition unremarkable, no oral mucosal masses or lesions, floor of mouth soft, tongue mobile without masses or lesions  Oropharynx:  Base of tongue soft and without masses, tonsils bilaterally unremarkable, soft palate mucosa unremarkable  Eyes:  Extra-ocular movements intact, pupils equally round and reactive to light and accommodation, the lids and conjunctivae are normal in appearance  Constitutional:  Well developed, well nourished and groomed, in no acute distress  Cardiovascular:  Normal rate and rhythm, no palpable thrills, no jugulovenous distension observed  Respiratory:  Normal respiratory effort without evidence of retractions or use of accessory muscles  Neurologic:  Cranial nerves II-XII intact bilaterally  Abdomen: Soft and lax  Extremities: No bruises   Psychiatric:  Alert and oriented to time, place and person  Procedures    Assessment:   1  Hypertrophy of both inferior nasal turbinates     2  Deviated nasal septum     3   Rhinitis, chronic     4  Chronic rhinosinusitis  CT sinus wo contrast     Orders  Orders Placed This Encounter   Procedures    CT sinus wo contrast     Standing Status:   Future     Standing Expiration Date:   5/16/2026     Scheduling Instructions: There is no prep for this study  Please bring your insurance cards, a form of photo ID and a list of your medications with you  Arrive 15 minutes prior to your appointment time to register  On the day of your test, please bring any prior CT or MRI studies of this area with you that were not       performed at a [facility]  To schedule this appointment, please contact Central Scheduling at [phone]  Order Specific Question:   Is the patient pregnant? Answer:   No     Order Specific Question:   What is the patient's sedation requirement? Answer:   No Sedation     Order Specific Question:   Release to patient through Mychart     Answer:   Immediate     Order Specific Question:   Reason for Exam (FREE TEXT)     Answer:   left sided facila pressure, PND and congestion for years, r/o CRS     Discussion/Plan:      Chronic rhinosinusitis, Rhinitis and Post nasal drip   Discussed treatment options including use of saline rinses, nasal steroids, allergy medications, allergy testing, imaging, and further surgical interventions  Given instructions on use of nasal steroids, saline rinses and allergy medications  CT scan ordered

## 2022-05-20 ENCOUNTER — OFFICE VISIT (OUTPATIENT)
Dept: FAMILY MEDICINE CLINIC | Facility: CLINIC | Age: 52
End: 2022-05-20
Payer: COMMERCIAL

## 2022-05-20 VITALS
HEIGHT: 65 IN | OXYGEN SATURATION: 97 % | BODY MASS INDEX: 32.19 KG/M2 | DIASTOLIC BLOOD PRESSURE: 82 MMHG | RESPIRATION RATE: 18 BRPM | HEART RATE: 86 BPM | TEMPERATURE: 98.1 F | WEIGHT: 193.2 LBS | SYSTOLIC BLOOD PRESSURE: 138 MMHG

## 2022-05-20 DIAGNOSIS — Z00.00 HEALTH CARE MAINTENANCE: Primary | ICD-10-CM

## 2022-05-20 DIAGNOSIS — Z12.31 ENCOUNTER FOR SCREENING MAMMOGRAM FOR MALIGNANT NEOPLASM OF BREAST: ICD-10-CM

## 2022-05-20 DIAGNOSIS — I10 ESSENTIAL HYPERTENSION: ICD-10-CM

## 2022-05-20 PROCEDURE — 3079F DIAST BP 80-89 MM HG: CPT | Performed by: NURSE PRACTITIONER

## 2022-05-20 PROCEDURE — 99396 PREV VISIT EST AGE 40-64: CPT | Performed by: NURSE PRACTITIONER

## 2022-05-20 PROCEDURE — 3075F SYST BP GE 130 - 139MM HG: CPT | Performed by: NURSE PRACTITIONER

## 2022-05-20 PROCEDURE — 3008F BODY MASS INDEX DOCD: CPT | Performed by: NURSE PRACTITIONER

## 2022-05-20 PROCEDURE — 1036F TOBACCO NON-USER: CPT | Performed by: NURSE PRACTITIONER

## 2022-05-20 PROCEDURE — 3725F SCREEN DEPRESSION PERFORMED: CPT | Performed by: NURSE PRACTITIONER

## 2022-05-20 RX ORDER — HYDROCHLOROTHIAZIDE 25 MG/1
12.5 TABLET ORAL DAILY
Qty: 30 TABLET | Refills: 7 | Status: SHIPPED | OUTPATIENT
Start: 2022-05-20

## 2022-05-20 NOTE — PROGRESS NOTES
FAMILY PRACTICE HEALTH MAINTENANCE OFFICE VISIT  [facility] - [facility]    NAME: Negin Tran  AGE: 46 y o  SEX: female  : 1970     DATE: 2022    Assessment and Plan     1  Health care maintenance    2  Encounter for screening mammogram for malignant neoplasm of breast  -     Mammo screening bilateral w 3d & cad; Future; Expected date: 2022    3  Essential hypertension  -     hydrochlorothiazide (HYDRODIURIL) 25 mg tablet; Take 0 5 tablets (12 5 mg total) by mouth in the morning  The case discussed with patient using patient centered shared decision making  The patient was counseled regarding instructions for management,-- risk factor reductions,-- prognosis,-- impressions,-- risks and benefits of treatment options,-- importance of compliance with treatment  I have reviewed the instructions with the patient, answering all questions to her satisfaction  Patient clinically stable at this time  Cont with current plan of care  Labs due 10/2022-will schedule ov  RTO as recommended and PRN    Patient Counseling:   Nutrition: Stressed importance of a well balanced diet, moderation of sodium/saturated fat, caloric balance and sufficient intake of fiber  Exercise: Stressed the importance of regular exercise with a goal of 150 minutes per week  Dental Health: Discussed daily flossing and brushing and regular dental visits   Injury Prevention: Discussed Safety Belts, Safety Helmets, and Smoke Detectors    Immunizations reviewed: Risks and Benefits discussed and Declined recommended vaccinations  Discussed benefits of:  Screening labs  BMI Counseling: Body mass index is 32 15 kg/m²  Discussed with patient's BMI with her   The BMI is above normal  Nutrition recommendations include reducing portion sizes, decreasing overall calorie intake, 3-5 servings of fruits/vegetables daily, consuming healthier snacks, moderation in carbohydrate intake and increasing intake of lean protein  Exercise recommendations include exercising 3-5 times per week  No follow-ups on file  Chief Complaint     Chief Complaint   Patient presents with    Annual Exam       History of Present Illness     HPI    Well Adult Physical   Patient here for a comprehensive physical exam       Diet and Physical Activity  Diet: well balanced diet  Exercise: several times per week --Mumtaz 4 times per week     Depression Screen  PHQ-2/9 Depression Screening    Little interest or pleasure in doing things: 0 - not at all  Feeling down, depressed, or hopeless: 0 - not at all  PHQ-2 Score: 0  PHQ-2 Interpretation: Negative depression screen          General Health  Hearing: Hearing aid use: bilateral  Vision: no vision problems and goes for regular eye exams  Dental: regular dental visits    Reproductive Health  Follows with gynecologist and asha-menopausal  +PMDD  GYN recommended SSRI for same  Mammo scheduled today      The following portions of the patient's history were reviewed and updated as appropriate: allergies, current medications, past family history, past medical history, past social history, past surgical history and problem list     Review of Systems     Review of Systems   Constitutional: Negative  HENT: Positive for congestion and sinus pressure  Work up in progress per ent   Respiratory: Negative  Gastrointestinal: Positive for constipation  Negative for blood in stool  Genitourinary: Negative  Musculoskeletal: Negative  Neurological: Negative  Psychiatric/Behavioral: Negative          Past Medical History     Past Medical History:   Diagnosis Date    Hard of hearing     Tympanic membrane perforation        Past Surgical History     Past Surgical History:   Procedure Laterality Date    TUBAL LIGATION         Social History     Social History     Socioeconomic History    Marital status: /Civil Union     Spouse name: None    Number of children: None    Years of education: None    Highest education level: None   Occupational History    None   Tobacco Use    Smoking status: Never Smoker    Smokeless tobacco: Never Used   Vaping Use    Vaping Use: Never used   Substance and Sexual Activity    Alcohol use: Yes     Comment: occasional    Drug use: None    Sexual activity: None   Other Topics Concern    None   Social History Narrative    Caffeine use- coffee     Social Determinants of Health     Financial Resource Strain: Not on file   Food Insecurity: Not on file   Transportation Needs: Not on file   Physical Activity: Not on file   Stress: Not on file   Social Connections: Not on file   Intimate Partner Violence: Not on file   Housing Stability: Not on file       Family History     Family History   Problem Relation Age of Onset    Cancer Mother     COPD Father     Cancer Father     Cancer Maternal Grandmother     COPD Paternal Aunt     Cancer Paternal Aunt     Breast cancer Paternal Aunt        Current Medications       Current Outpatient Medications:     hydrochlorothiazide (HYDRODIURIL) 25 mg tablet, Take 0 5 tablets (12 5 mg total) by mouth in the morning , Disp: 30 tablet, Rfl: 7    losartan (COZAAR) 25 mg tablet, Take 1 tablet (25 mg total) by mouth daily, Disp: 90 tablet, Rfl: 1    trospium (SANCTURA XR) 60 mg 24 hr capsule, TAKE 1 CAPSULE BY MOUTH EVERY DAY ON EMPTY STOMACHE 1 HOUR BEFORE MEAL (Patient not taking: Reported on 5/20/2022), Disp: , Rfl:      Allergies     Allergies   Allergen Reactions    Penicillins Hives       Objective     /82   Pulse 86   Temp 98 1 °F (36 7 °C)   Resp 18   Ht 5' 5" (1 651 m)   Wt 87 6 kg (193 lb 3 2 oz)   SpO2 97%   BMI 32 15 kg/m²      Physical Exam  Vitals and nursing note reviewed  Constitutional:       General: She is not in acute distress  Appearance: Normal appearance  She is well-developed  HENT:      Head: Normocephalic and atraumatic     Eyes:      General: Lids are normal  Lids are everted, no foreign bodies appreciated  Conjunctiva/sclera: Conjunctivae normal       Pupils: Pupils are equal, round, and reactive to light  Neck:      Thyroid: No thyroid mass or thyromegaly  Vascular: No carotid bruit or JVD  Cardiovascular:      Rate and Rhythm: Normal rate and regular rhythm  Pulses: Normal pulses  Heart sounds: Normal heart sounds  No murmur heard  Pulmonary:      Effort: Pulmonary effort is normal       Breath sounds: Normal breath sounds  Abdominal:      General: Bowel sounds are normal       Palpations: Abdomen is soft  There is no hepatomegaly or splenomegaly  Tenderness: There is no abdominal tenderness  Musculoskeletal:      Right lower leg: No edema  Left lower leg: No edema  Lymphadenopathy:      Cervical: No cervical adenopathy  Skin:     General: Skin is warm and dry  Coloration: Skin is not pale  Neurological:      General: No focal deficit present  Mental Status: She is alert  Mental status is at baseline     Psychiatric:         Mood and Affect: Mood normal          Behavior: Behavior normal            No exam data present        Deion Yusuf, [address] [facility] [address]

## 2022-05-26 DIAGNOSIS — I10 ESSENTIAL HYPERTENSION: ICD-10-CM

## 2022-05-26 RX ORDER — LOSARTAN POTASSIUM 25 MG/1
TABLET ORAL
Qty: 90 TABLET | Refills: 1 | Status: SHIPPED | OUTPATIENT
Start: 2022-05-26

## 2022-06-03 ENCOUNTER — HOSPITAL ENCOUNTER (OUTPATIENT)
Dept: RADIOLOGY | Facility: HOSPITAL | Age: 52
Discharge: HOME | End: 2022-06-03
Attending: STUDENT IN AN ORGANIZED HEALTH CARE EDUCATION/TRAINING PROGRAM
Payer: COMMERCIAL

## 2022-06-03 DIAGNOSIS — J32.9 CHRONIC RHINOSINUSITIS: ICD-10-CM

## 2022-06-03 DIAGNOSIS — J31.0 CHRONIC RHINOSINUSITIS: ICD-10-CM

## 2022-06-03 PROCEDURE — G1004 CDSM NDSC: HCPCS

## 2022-06-03 PROCEDURE — 70486 CT MAXILLOFACIAL W/O DYE: CPT

## 2022-06-20 ENCOUNTER — OFFICE VISIT (OUTPATIENT)
Dept: OTOLARYNGOLOGY | Facility: CLINIC | Age: 52
End: 2022-06-20
Payer: COMMERCIAL

## 2022-06-20 VITALS — HEIGHT: 65 IN | BODY MASS INDEX: 32.15 KG/M2 | TEMPERATURE: 97.5 F | WEIGHT: 193 LBS

## 2022-06-20 DIAGNOSIS — J31.0 CHRONIC RHINOSINUSITIS: ICD-10-CM

## 2022-06-20 DIAGNOSIS — J32.9 CHRONIC RHINOSINUSITIS: ICD-10-CM

## 2022-06-20 DIAGNOSIS — J34.3 HYPERTROPHY OF BOTH INFERIOR NASAL TURBINATES: Primary | ICD-10-CM

## 2022-06-20 DIAGNOSIS — J31.0 RHINITIS, CHRONIC: ICD-10-CM

## 2022-06-20 DIAGNOSIS — J34.2 DEVIATED NASAL SEPTUM: ICD-10-CM

## 2022-06-20 PROCEDURE — 99213 OFFICE O/P EST LOW 20 MIN: CPT | Performed by: STUDENT IN AN ORGANIZED HEALTH CARE EDUCATION/TRAINING PROGRAM

## 2022-06-20 PROCEDURE — 1036F TOBACCO NON-USER: CPT | Performed by: STUDENT IN AN ORGANIZED HEALTH CARE EDUCATION/TRAINING PROGRAM

## 2022-06-20 PROCEDURE — 3008F BODY MASS INDEX DOCD: CPT | Performed by: STUDENT IN AN ORGANIZED HEALTH CARE EDUCATION/TRAINING PROGRAM

## 2022-06-20 RX ORDER — MIRABEGRON 25 MG/1
1 TABLET, FILM COATED, EXTENDED RELEASE ORAL DAILY
COMMUNITY
Start: 2022-05-19 | End: 2023-07-10 | Stop reason: SDUPTHER

## 2022-06-20 NOTE — PROGRESS NOTES
Otolaryngology-- Head and Neck Surgery Follow up visit      Follow up:    Here for CT sinuses results review  CT scan showed DNS, bilateral enlarged inferior trbinates and OMC complex narrowing and right maxillary cyst         Review of any relevant imaging:      Interval Review of systems: Pertinent review of systems documented in the HPI  Interval Social History:  Social History     Socioeconomic History    Marital status: /Civil Union     Spouse name: Not on file    Number of children: Not on file    Years of education: Not on file    Highest education level: Not on file   Occupational History    Not on file   Tobacco Use    Smoking status: Never Smoker    Smokeless tobacco: Never Used   Vaping Use    Vaping Use: Never used   Substance and Sexual Activity    Alcohol use: Yes     Comment: occasional    Drug use: Not on file    Sexual activity: Not on file   Other Topics Concern    Not on file   Social History Narrative    Caffeine use- coffee     Social Determinants of Health     Financial Resource Strain: Not on file   Food Insecurity: Not on file   Transportation Needs: Not on file   Physical Activity: Not on file   Stress: Not on file   Social Connections: Not on file   Intimate Partner Violence: Not on file   Housing Stability: Not on file       Interval Physical Examination:  Temp 97 5 °F (36 4 °C) (Temporal)   Ht 5' 5" (1 651 m)   Wt 87 5 kg (193 lb)   BMI 32 12 kg/m²   Head: Atraumatic, no visible scalp lesions, parotid and submandibular salivary glands non-tender to palpation and without masses bilaterally  Neck:  No visible or palpable cervical lesions or lymphadenopathy, thyroid gland is normal in size and symmetry and without masses, normal laryngeal elevation with swallowing  Ears:    Right ear :  Auricle normal in appearance, mastoid prominence non-tender, external auditory canal clear  Tympanic membranes intact     Left ear :  Auricle normal in appearance, mastoid prominence non-tender, external auditory canal clear   Tympanic membranes intact  Nose/Sinuses:  External appearance unremarkable, no maxillary or frontal sinus tenderness to palpation bilaterally  Nasal endoscopic examination showed deviated nasal septum, bilateral enlarged inferior turbinates, no polyps, thick mucus, middle, superior meatus and sphenoethmoid recess clear  Oral Cavity:  Moist mucus membranes, gums and dentition unremarkable, no oral mucosal masses or lesions, floor of mouth soft, tongue mobile without masses or lesions  Oropharynx:  Base of tongue soft and without masses, tonsils bilaterally unremarkable, soft palate mucosa unremarkable  Procedure:      Assessment:  No diagnosis found  Plan:    Deviated nasal septum and bilateral enlarged inferior turbinates and OME blockage  I recommend septoplasty and bilateral inferior turbinates reduction and bilateral maxillary antrostomy Benefits were discussed include better breathing  Risks were discussed including but no limited to bleeding, infection, septal perforation, need for revision nose surgery and injury to surrounding structures  The patient demonstrated understanding these risks  All of their questions were answered and they wish to proceed as planned

## 2022-07-01 ENCOUNTER — HOSPITAL ENCOUNTER (OUTPATIENT)
Dept: RADIOLOGY | Facility: HOSPITAL | Age: 52
Discharge: HOME | End: 2022-07-01
Payer: COMMERCIAL

## 2022-07-01 VITALS — HEIGHT: 65 IN | BODY MASS INDEX: 32.15 KG/M2 | WEIGHT: 193 LBS

## 2022-07-01 DIAGNOSIS — Z12.31 ENCOUNTER FOR SCREENING MAMMOGRAM FOR MALIGNANT NEOPLASM OF BREAST: ICD-10-CM

## 2022-07-01 PROCEDURE — 77063 BREAST TOMOSYNTHESIS BI: CPT

## 2022-07-01 PROCEDURE — 77067 SCR MAMMO BI INCL CAD: CPT

## 2022-07-08 ENCOUNTER — HOSPITAL ENCOUNTER (OUTPATIENT)
Dept: RADIOLOGY | Facility: HOSPITAL | Age: 52
Discharge: HOME | End: 2022-07-08

## 2022-09-07 ENCOUNTER — TELEPHONE (OUTPATIENT)
Dept: FAMILY MEDICINE CLINIC | Facility: CLINIC | Age: 52
End: 2022-09-07

## 2022-09-07 NOTE — TELEPHONE ENCOUNTER
Lm to confirm that this family will be staying with [address] [place] [address] and not following Briseyda echeverria

## 2022-10-27 ENCOUNTER — OFFICE VISIT (OUTPATIENT)
Dept: AUDIOLOGY | Facility: CLINIC | Age: 52
End: 2022-10-27
Payer: COMMERCIAL

## 2022-10-27 ENCOUNTER — OFFICE VISIT (OUTPATIENT)
Dept: AUDIOLOGY | Facility: CLINIC | Age: 52
End: 2022-10-27

## 2022-10-27 DIAGNOSIS — H90.3 SENSORY HEARING LOSS, BILATERAL: Primary | ICD-10-CM

## 2022-10-27 DIAGNOSIS — H90.3 SENSORINEURAL HEARING LOSS (SNHL) OF BOTH EARS: ICD-10-CM

## 2022-10-27 DIAGNOSIS — H90.3 SENSORINEURAL HEARING LOSS (SNHL) OF BOTH EARS: Primary | ICD-10-CM

## 2022-10-27 PROCEDURE — 92557 COMPREHENSIVE HEARING TEST: CPT | Performed by: AUDIOLOGIST

## 2022-10-27 PROCEDURE — 92567 TYMPANOMETRY: CPT | Performed by: AUDIOLOGIST

## 2022-10-27 NOTE — PROGRESS NOTES
Hearing Aid Visit:    Name:  Glo Pritchard  :  1970  Age:  46 y o  Date of Evaluation: 10/27/22     Glo Pritchard is being seen for a hearing aid visit  Patient is fit with Nonnie Dawson 2 Pro hearing aid(s) fit in 2016  Right serial number [id number]  Left serial number [id number]  Warranty date: OOW 19 (Loss/Damage and repair)  Left aid repair OOW     Patient reports the left volume control button is not working  She is also noting a lot of feedback from it  Action:  1  Discussed a possible lab repair ($385 as it is over 11years old)  She wants to wait to see if she is DVR eligible (for a new pair) as she did work with them on the initial pair  2  The domes were changed to 6 mm DB instead of open to see if feedback stops and assist with volume  Her wax guards were in good shape  Recommendations:   1  Patient to call / contact when she hears back from [facility]  I will send the referral form    (attached)       Miranda [place] , CCC-A, [place]# [id number], Hearing Aid Dispenser, [place]# [id number]  Clinical Audiologist

## 2022-10-27 NOTE — PROGRESS NOTES
HEARING EVALUATION    Name:  Arielle Magallon  :  1970  Age:  46 y o  Date of Evaluation: 10/27/22     History:  Known bilateral hearing loss with use of DEIDRE HA's fit in   Reason for visit: Arielle Magallon is being seen today at the request of Dr Ramiro Salinas for an evaluation of hearing  Patient reports some difficulties hearing in noisy communication situations  Her hearing aids are 10years old and the left aid is in need of lab repair  EVALUATION:    Otoscopic Evaluation:   Right Ear: clear canal    Left Ear: clear canal     Tympanometry:   Right: Type A - normal middle ear pressure and compliance   Left: Type A - normal middle ear pressure and compliance    Audiogram Results:  Mild sloping to severe SHL bilaterally  Word recognition scores are very good bilaterally at 70 dBHL  Thresholds are stable to those obtained on previous evaluations  *see attached audiogram for details     RECOMMENDATIONS:  1  Referral to [place] [facility] for consult in order to obtain assistance with new devices  2  Annual hearing evaluations for monitoring purposes  PATIENT EDUCATION:   Discussed results and recommendations with Mrs Ning Rothman  Questions were addressed and the patient was encouraged to contact our department should concerns arise        Miranda Kamara , [facility], [place]# [id number], Hearing Aid Dispenser, [place]# [id number]  Clinical Audiologist

## 2022-12-12 DIAGNOSIS — J06.9 UPPER RESPIRATORY TRACT INFECTION, UNSPECIFIED TYPE: Primary | ICD-10-CM

## 2022-12-12 RX ORDER — AZITHROMYCIN 250 MG/1
TABLET, FILM COATED ORAL
Qty: 6 TABLET | Refills: 0 | Status: SHIPPED | OUTPATIENT
Start: 2022-12-12 | End: 2022-12-13 | Stop reason: SDUPTHER

## 2022-12-13 ENCOUNTER — TELEMEDICINE (OUTPATIENT)
Dept: FAMILY MEDICINE CLINIC | Facility: CLINIC | Age: 52
End: 2022-12-13

## 2022-12-13 DIAGNOSIS — J01.01 ACUTE RECURRENT MAXILLARY SINUSITIS: Primary | ICD-10-CM

## 2022-12-13 DIAGNOSIS — J06.9 UPPER RESPIRATORY TRACT INFECTION, UNSPECIFIED TYPE: ICD-10-CM

## 2022-12-13 DIAGNOSIS — I10 ESSENTIAL HYPERTENSION: ICD-10-CM

## 2022-12-13 PROBLEM — N32.81 OVERACTIVE BLADDER: Status: ACTIVE | Noted: 2022-12-13

## 2022-12-13 RX ORDER — AZITHROMYCIN 250 MG/1
TABLET, FILM COATED ORAL
Qty: 6 TABLET | Refills: 0 | Status: SHIPPED | OUTPATIENT
Start: 2022-12-13 | End: 2022-12-18

## 2022-12-13 NOTE — PROGRESS NOTES
Virtual Regular Visit    Verification of patient location:    Patient is located in the following state in which I hold an active license [place]      Assessment/Plan:    Problem List Items Addressed This Visit        Cardiovascular and Mediastinum    Essential hypertension   Other Visit Diagnoses     Acute recurrent maxillary sinusitis    -  Primary    Relevant Medications    azithromycin (Zithromax) 250 mg tablet    Upper respiratory tract infection, unspecified type        Relevant Medications    azithromycin (Zithromax) 250 mg tablet        Concern for acute recurrent maxillary sinusitis, known sinus obstruction with planned ENT surgical procedure January  Patient notes that Daisey Prader has worked well in the past even though this is not first-line therapy  Continue OTC symptomatic management  Follow-up recommended if symptoms worsen or not improving  Establish care recommended February after sinus procedure  Reason for visit is   Chief Complaint   Patient presents with   • Virtual Regular Visit        Encounter provider Noemí Manriquez DO    Provider located at [address] [place] [id number]      Recent Visits  No visits were found meeting these conditions  Showing recent visits within past 7 days and meeting all other requirements  Today's Visits  Date Type Provider Dept   12/13/22 Telemedicine [facility] today's visits and meeting all other requirements  Future Appointments  No visits were found meeting these conditions  Showing future appointments within next 150 days and meeting all other requirements       The patient was identified by name and date of birth  Eun Menon was informed that this is a telemedicine visit and that the visit is being conducted through the [facility]  She agrees to proceed     My office door was closed  No one else was in the room    She acknowledged consent and understanding of privacy and security of the video platform  The patient has agreed to participate and understands they can discontinue the visit at any time  Patient is aware this is a billable service  Jocelyn Parsons is a 46 y o  female coming in as a same-day visit secondary to recurrent maxillary sinusitis symptoms  Patient has been dealing with this for a while, does have a scheduled sinus surgery at the end of January  Patient notes a Ledy Lindsey has worked well in the past for her  Past Medical History:   Diagnosis Date   • Hard of hearing    • Tympanic membrane perforation        Past Surgical History:   Procedure Laterality Date   • TUBAL LIGATION         Current Outpatient Medications   Medication Sig Dispense Refill   • azithromycin (Zithromax) 250 mg tablet Take 2 tablets (500 mg total) by mouth daily for 1 day, THEN 1 tablet (250 mg total) daily for 4 days  6 tablet 0   • losartan (COZAAR) 25 mg tablet take 1 tablet by mouth once daily 90 tablet 1   • Myrbetriq 25 MG TB24 Take 1 tablet by mouth in the morning       No current facility-administered medications for this visit  Allergies   Allergen Reactions   • Penicillins Hives       Review of Systems   Constitutional: Negative for chills, fatigue and fever  HENT: Positive for congestion, postnasal drip, sinus pressure and sinus pain  Negative for sore throat and trouble swallowing  Respiratory: Negative for cough, shortness of breath and wheezing  Cardiovascular: Negative for chest pain and leg swelling  Neurological: Negative for dizziness and headaches  Video Exam    There were no vitals filed for this visit  Physical Exam  Constitutional:       General: She is not in acute distress  HENT:      Head: Normocephalic and atraumatic  Mouth/Throat:      Pharynx: Oropharynx is clear  No oropharyngeal exudate or posterior oropharyngeal erythema     Eyes:      General: No scleral icterus  Conjunctiva/sclera: Conjunctivae normal    Pulmonary:      Effort: Pulmonary effort is normal  No respiratory distress  Skin:     Coloration: Skin is not jaundiced  Findings: No bruising  Neurological:      General: No focal deficit present  Mental Status: She is alert  Mental status is at baseline     Psychiatric:         Mood and Affect: Mood normal          Behavior: Behavior normal           I spent 14 minutes with patient today in which greater than 50% of the time was spent in counseling/coordination of care regarding Acute care management

## 2022-12-20 ENCOUNTER — OFFICE VISIT (OUTPATIENT)
Dept: URGENT CARE | Facility: CLINIC | Age: 52
End: 2022-12-20

## 2022-12-20 VITALS
WEIGHT: 189 LBS | RESPIRATION RATE: 18 BRPM | HEART RATE: 79 BPM | BODY MASS INDEX: 31.49 KG/M2 | TEMPERATURE: 97.7 F | HEIGHT: 65 IN | DIASTOLIC BLOOD PRESSURE: 88 MMHG | OXYGEN SATURATION: 99 % | SYSTOLIC BLOOD PRESSURE: 161 MMHG

## 2022-12-20 DIAGNOSIS — M25.561 RIGHT KNEE PAIN, UNSPECIFIED CHRONICITY: Primary | ICD-10-CM

## 2022-12-20 NOTE — PROGRESS NOTES
[facility]        NAME: Annika Franklin is a 46 y o  female  : 1970    MRN: [id number]  DATE: 2022  TIME: 9:58 AM    Assessment and Plan   Right knee pain, unspecified chronicity [M25 561]  1  Right knee pain, unspecified chronicity          Patient Instructions   Right knee pain  Exam normal, likely strain from overuse   RICE- rest, ice (20 min on, 20 min off), compression with ace bandage, and elevation while at home  Tylenol/ibuprofen as needed  If continues rec f/u with ortho as outpt    Follow up with PCP in 3-5 days  Proceed to  ER if symptoms worsen  Chief Complaint     Chief Complaint   Patient presents with   • Knee Pain     C/O INTERMITTENT RIGHT KNEE PAIN X YRS  REPORTS OF USUALLY BEING ABLE TO USE BRACE, AND MOTRIN WITH RELIEF  X Saturday PAIN NOT BEING RELIEVED   History of Present Illness       Kiko Fitzgerald is a 27-year-old female who presents to clinic complaining of right knee pain x4 days  She states that she has a history of right knee pain and usually is mild and resolves on its own with rest and NSAIDs however this pain for the last 4 days seems worse and does not seem to be getting better despite normal conservative management  She denies any injury, trauma, or overuse  She did note some swelling last night but does not note any today  She denies any bruising or redness  Denies any paresthesias  Review of Systems   Review of Systems   Constitutional: Negative  Musculoskeletal: Positive for arthralgias  Negative for gait problem and joint swelling  Skin: Negative for color change and wound  Neurological: Negative for numbness           Current Medications       Current Outpatient Medications:   •  losartan (COZAAR) 25 mg tablet, take 1 tablet by mouth once daily, Disp: 90 tablet, Rfl: 1  •  Myrbetriq 25 MG TB24, Take 1 tablet by mouth in the morning, Disp: , Rfl:     Current Allergies     Allergies as of 2022 - Reviewed 2022 Allergen Reaction Noted   • Penicillins Hives 03/12/2013            The following portions of the patient's history were reviewed and updated as appropriate: allergies, current medications, past family history, past medical history, past social history, past surgical history and problem list      Past Medical History:   Diagnosis Date   • Hard of hearing    • Tympanic membrane perforation        Past Surgical History:   Procedure Laterality Date   • TUBAL LIGATION         Family History   Problem Relation Age of Onset   • Cancer Mother    • COPD Father    • Cancer Father    • Cancer Maternal Grandmother    • COPD Paternal Aunt    • Cancer Paternal Aunt    • Breast cancer Paternal Aunt          Medications have been verified  Objective   /88   Pulse 79   Temp 97 7 °F (36 5 °C)   Resp 18   Ht 5' 5" (1 651 m)   Wt 85 7 kg (189 lb)   SpO2 99%   BMI 31 45 kg/m²   No LMP recorded  Patient is perimenopausal        Physical Exam     Physical Exam  Vitals and nursing note reviewed  Constitutional:       General: She is not in acute distress  Appearance: Normal appearance  She is not ill-appearing  Cardiovascular:      Rate and Rhythm: Normal rate and regular rhythm  Heart sounds: Normal heart sounds  Pulmonary:      Effort: Pulmonary effort is normal       Breath sounds: Normal breath sounds  Musculoskeletal:      Right knee: No swelling, deformity, effusion, erythema, ecchymosis, bony tenderness or crepitus  Normal range of motion  No tenderness  Normal alignment, normal meniscus and normal patellar mobility  Neurological:      Mental Status: She is alert and oriented to person, place, and time     Psychiatric:         Mood and Affect: Mood normal          Behavior: Behavior normal

## 2022-12-21 ENCOUNTER — TELEPHONE (OUTPATIENT)
Dept: OBGYN CLINIC | Facility: OTHER | Age: 52
End: 2022-12-21

## 2022-12-21 NOTE — TELEPHONE ENCOUNTER
I received a Care Request from patient to schedule for:    Where Does it Hurt? Knee pain, possible arthritis (told by urgent care to follow up)  Are you considering joint replacement? No   Are you seeking a second opinion? No  If yes, who is your doctor? I lvm to [facility] at [phone]

## 2022-12-22 DIAGNOSIS — I10 ESSENTIAL HYPERTENSION: ICD-10-CM

## 2022-12-22 RX ORDER — LOSARTAN POTASSIUM 25 MG/1
TABLET ORAL
Qty: 90 TABLET | Refills: 1 | Status: SHIPPED | OUTPATIENT
Start: 2022-12-22

## 2023-01-16 DIAGNOSIS — I10 ESSENTIAL HYPERTENSION: ICD-10-CM

## 2023-01-16 RX ORDER — LOSARTAN POTASSIUM 25 MG/1
25 TABLET ORAL DAILY
Qty: 90 TABLET | Refills: 0 | Status: SHIPPED | OUTPATIENT
Start: 2023-01-16

## 2023-05-16 ENCOUNTER — OFFICE VISIT (OUTPATIENT)
Dept: AUDIOLOGY | Facility: CLINIC | Age: 53
End: 2023-05-16

## 2023-05-16 DIAGNOSIS — H90.3 SENSORY HEARING LOSS, BILATERAL: Primary | ICD-10-CM

## 2023-05-18 NOTE — PROGRESS NOTES
Progress Note    Name:  Janice Sosa  :  1970  Age:  48 y o  Date of Evaluation: 23     Patient dropped off both hearing aids 23  The left aid was sent out for repair to [employer] as the volume control is not working      (Sent 23)     Patient is on hold with DVR due to finances    Miranda Gil , [place], [place]# [id number]  Clinical Audiologist

## 2023-05-23 ENCOUNTER — DOCUMENTATION (OUTPATIENT)
Dept: AUDIOLOGY | Facility: CLINIC | Age: 53
End: 2023-05-23

## 2023-05-23 DIAGNOSIS — H90.3 SENSORINEURAL HEARING LOSS (SNHL), BILATERAL: Primary | ICD-10-CM

## 2023-05-23 NOTE — PROGRESS NOTES
Device received from [facility] repair  Battery installed  Listening check revealed good sound quality  Lan Gonsalez    Clinical Audiologist    [address] [address] [id number]

## 2023-05-30 ENCOUNTER — OFFICE VISIT (OUTPATIENT)
Dept: AUDIOLOGY | Facility: CLINIC | Age: 53
End: 2023-05-30

## 2023-05-30 DIAGNOSIS — H90.3 SENSORY HEARING LOSS, BILATERAL: Primary | ICD-10-CM

## 2023-05-30 NOTE — PROGRESS NOTES
Hearing Aid Visit:    Name:  Brian Curry  :  1970  Age:  48 y o  Date of Evaluation: 23     Brian Curry is being seen for a hearing aid  of the left lab repair  Patient is fit with Ruthe Pleva 2 Pro hearing aid(s) fit in 2016  Right serial number [id number]  Left serial number [id number]  Warranty date: OOW 19 (Loss/Damage and repair)  Left aid repair [employer] repair until 2024 (kept serial #)    Action:  1  Reconnected both aids to computer to ensure the VC's were on and connected  2  Aids were in good working order via listening check     Recommendations:   1  RTO  2023 for HA service  2  Patient decided to wait until next year for [facility] services as her portion out of pocket would currently be cost prohibitive        Miranda Guy , CCC-A, [place]# [id number]  Clinical Audiologist

## 2023-07-10 ENCOUNTER — OFFICE VISIT (OUTPATIENT)
Dept: OBGYN CLINIC | Facility: CLINIC | Age: 53
End: 2023-07-10
Payer: COMMERCIAL

## 2023-07-10 VITALS
HEIGHT: 65 IN | SYSTOLIC BLOOD PRESSURE: 130 MMHG | DIASTOLIC BLOOD PRESSURE: 90 MMHG | WEIGHT: 195 LBS | BODY MASS INDEX: 32.49 KG/M2

## 2023-07-10 DIAGNOSIS — Z12.31 SCREENING MAMMOGRAM, ENCOUNTER FOR: ICD-10-CM

## 2023-07-10 DIAGNOSIS — N32.81 OVERACTIVE BLADDER: Primary | ICD-10-CM

## 2023-07-10 DIAGNOSIS — Z01.419 WOMEN'S ANNUAL ROUTINE GYNECOLOGICAL EXAMINATION: Primary | ICD-10-CM

## 2023-07-10 PROCEDURE — G0145 SCR C/V CYTO,THINLAYER,RESCR: HCPCS | Performed by: NURSE PRACTITIONER

## 2023-07-10 PROCEDURE — S0610 ANNUAL GYNECOLOGICAL EXAMINA: HCPCS | Performed by: NURSE PRACTITIONER

## 2023-07-10 PROCEDURE — G0476 HPV COMBO ASSAY CA SCREEN: HCPCS | Performed by: NURSE PRACTITIONER

## 2023-07-10 RX ORDER — MIRABEGRON 50 MG/1
TABLET, FILM COATED, EXTENDED RELEASE ORAL
COMMUNITY
Start: 2023-05-19 | End: 2023-07-10

## 2023-07-10 RX ORDER — MIRABEGRON 25 MG/1
25 TABLET, FILM COATED, EXTENDED RELEASE ORAL DAILY
Qty: 90 TABLET | Refills: 1 | Status: SHIPPED | OUTPATIENT
Start: 2023-07-10

## 2023-07-10 NOTE — PROGRESS NOTES
Subjective    HPI:     Migel Hall is a 48 y.o. postmenopausal female. She is happily  for 32 years. She is a [place] 2 Para 2, with  x 2. Her current method of contraception includes tubal ligation. She denies issues with intimacy. History of bladder mesh sling surgery. She still experiences some incontinence. She denies GI and Gyn complaints. She feels safe at home. She denies depression/anxiety. Medical, surgical and family history reviewed. Her dental care is up-to-date. She eats a healthy diet. She joined the gym. Visit Vitals  /90 (BP Location: Right arm, Patient Position: Sitting)   Ht 5' 5" (1.651 m)   Wt 88.5 kg (195 lb)   LMP 2022 (Approximate)   BMI 32.45 kg/m²   OB Status Postmenopausal   Smoking Status Never   BSA 1.96 m²       Gynecologic History    Patient's last menstrual period was 2022 (approximate). Last Pap: 17. Results were: normal  Last mammogram: 22. Results were: normal  Colonoscopy: Cologuard 21 - normal    Obstetric and Medical History    OB History    Para Term  AB Living   2 2 2         SAB IAB Ectopic Multiple Live Births                  # Outcome Date GA Lbr Cristopher/2nd Weight Sex Delivery Anes PTL Lv   2 Term            1 Term                Past Medical History:   Diagnosis Date   • Hard of hearing    • Tympanic membrane perforation        Past Surgical History:   Procedure Laterality Date   • TUBAL LIGATION         The following portions of the patient's history were reviewed and updated as appropriate: allergies, current medications, past family history, past medical history, past social history, past surgical history and problem list.    Review of Systems    Pertinent items are noted in HPI. Objective    Physical Exam  Constitutional:       Appearance: Normal appearance. She is well-developed. Genitourinary:      Vulva, bladder and urethral meatus normal.      No lesions in the vagina.       Right Labia: No rash, tenderness, lesions, skin changes or Bartholin's cyst.     Left Labia: No tenderness, lesions, skin changes, Bartholin's cyst or rash. No labial fusion noted. No inguinal adenopathy present in the right or left side. No vaginal discharge, erythema, tenderness, bleeding or granulation tissue. No vaginal prolapse present. No vaginal atrophy present. Right Adnexa: not tender, not full and no mass present. Left Adnexa: not tender, not full and no mass present. Cervix is parous. No cervical motion tenderness, discharge, friability, lesion, polyp or nabothian cyst.      Uterus is not enlarged, tender, irregular or prolapsed. No uterine mass detected. Uterus is anteverted. Pelvic exam was performed with patient in the lithotomy position. Breasts:     Breasts are symmetrical.      Right: No inverted nipple, mass, nipple discharge, skin change or tenderness. Left: No inverted nipple, mass, nipple discharge, skin change or tenderness. HENT:      Head: Normocephalic and atraumatic. Neck:      Thyroid: No thyromegaly. Cardiovascular:      Rate and Rhythm: Normal rate and regular rhythm. Heart sounds: Normal heart sounds, S1 normal and S2 normal.   Pulmonary:      Effort: Pulmonary effort is normal.      Breath sounds: Normal breath sounds. Abdominal:      General: Bowel sounds are normal. There is no distension. Palpations: Abdomen is soft. There is no mass. Tenderness: There is no abdominal tenderness. There is no guarding. Hernia: There is no hernia in the left inguinal area or right inguinal area. Musculoskeletal:      Cervical back: Neck supple. Lymphadenopathy:      Cervical: No cervical adenopathy. Upper Body:      Right upper body: No supraclavicular or axillary adenopathy. Left upper body: No supraclavicular or axillary adenopathy. Lower Body: No right inguinal adenopathy. No left inguinal adenopathy. Neurological:      Mental Status: She is alert. Skin:     General: Skin is warm and dry. Findings: No rash. Psychiatric:         Attention and Perception: Attention and perception normal.         Mood and Affect: Mood and affect normal.         Speech: Speech normal.         Behavior: Behavior is cooperative. Thought Content: Thought content normal.         Cognition and Memory: Cognition and memory normal.         Judgment: Judgment normal.   Vitals and nursing note reviewed. Assessment and Plan    Honey was seen today for gynecologic exam.    Diagnoses and all orders for this visit:    Women's annual routine gynecological examination    Screening mammogram, encounter for  -     Mammo screening bilateral w 3d & cad; Future      Patient informed of a Stable GYN exam. A pap smear was performed. I have discussed the importance of exercise and healthy diet as well as adequate intake of calcium and vitamin D. The current ASCCP guidelines were reviewed. The low risk patient will receive pap smear screening every 3 years until the age of 34 and then every 3 to 5 years with HPV co-testing from the ages of 32-69. I emphasized the importance of an annual pelvic and breast exam. A yearly mammogram is due. Results will be released to [facility], if abnormal will call to review and discuss treatment plan. All questions have been answered to her satisfaction. Education reviewed: self breast exams. Mammogram ordered. Follow up in: 1 year or sooner if needed.

## 2023-07-12 LAB
HPV HR 12 DNA CVX QL NAA+PROBE: POSITIVE
HPV16 DNA CVX QL NAA+PROBE: NEGATIVE
HPV18 DNA CVX QL NAA+PROBE: NEGATIVE

## 2023-07-19 ENCOUNTER — TELEPHONE (OUTPATIENT)
Dept: OBGYN CLINIC | Facility: CLINIC | Age: 53
End: 2023-07-19

## 2023-07-19 LAB
LAB AP GYN PRIMARY INTERPRETATION: NORMAL
Lab: NORMAL

## 2023-07-19 NOTE — TELEPHONE ENCOUNTER
Spoke with patient and reviewed pap smear results which show negative cytology with + other HR HPV. She is negative HPV for 16 and 18. Reviewed recommendations with patient to repeat pap in one year. All questions and concerns addressed and patient verbalized understanding.

## 2023-07-26 ENCOUNTER — OFFICE VISIT (OUTPATIENT)
Dept: FAMILY MEDICINE CLINIC | Facility: CLINIC | Age: 53
End: 2023-07-26
Payer: COMMERCIAL

## 2023-07-26 VITALS
DIASTOLIC BLOOD PRESSURE: 94 MMHG | RESPIRATION RATE: 16 BRPM | TEMPERATURE: 98.3 F | HEART RATE: 88 BPM | HEIGHT: 65 IN | WEIGHT: 193.8 LBS | SYSTOLIC BLOOD PRESSURE: 144 MMHG | BODY MASS INDEX: 32.29 KG/M2

## 2023-07-26 DIAGNOSIS — N32.81 OVERACTIVE BLADDER: ICD-10-CM

## 2023-07-26 DIAGNOSIS — Z13.220 SCREENING FOR LIPID DISORDERS: ICD-10-CM

## 2023-07-26 DIAGNOSIS — Z86.16 HISTORY OF COVID-19: ICD-10-CM

## 2023-07-26 DIAGNOSIS — Z00.00 ANNUAL PHYSICAL EXAM: Primary | ICD-10-CM

## 2023-07-26 DIAGNOSIS — N95.1 MENOPAUSAL SYNDROME (HOT FLUSHES): ICD-10-CM

## 2023-07-26 DIAGNOSIS — R06.09 DOE (DYSPNEA ON EXERTION): ICD-10-CM

## 2023-07-26 DIAGNOSIS — H91.93 BILATERAL HEARING LOSS, UNSPECIFIED HEARING LOSS TYPE: ICD-10-CM

## 2023-07-26 DIAGNOSIS — Z13.0 SCREENING FOR DEFICIENCY ANEMIA: ICD-10-CM

## 2023-07-26 DIAGNOSIS — Z11.59 NEED FOR HEPATITIS C SCREENING TEST: ICD-10-CM

## 2023-07-26 DIAGNOSIS — Z13.29 SCREENING FOR THYROID DISORDER: ICD-10-CM

## 2023-07-26 DIAGNOSIS — R61 HYPERHIDROSIS: ICD-10-CM

## 2023-07-26 DIAGNOSIS — I10 HYPERTENSION, UNSPECIFIED TYPE: ICD-10-CM

## 2023-07-26 LAB
SL AMB  POCT GLUCOSE, UA: NORMAL
SL AMB LEUKOCYTE ESTERASE,UA: NORMAL
SL AMB POCT BILIRUBIN,UA: NORMAL
SL AMB POCT BLOOD,UA: NORMAL
SL AMB POCT CLARITY,UA: CLEAR
SL AMB POCT COLOR,UA: YELLOW
SL AMB POCT KETONES,UA: NORMAL
SL AMB POCT NITRITE,UA: NORMAL
SL AMB POCT PH,UA: 5
SL AMB POCT SPECIFIC GRAVITY,UA: 1.02
SL AMB POCT URINE PROTEIN: NORMAL
SL AMB POCT UROBILINOGEN: NORMAL

## 2023-07-26 PROCEDURE — 81003 URINALYSIS AUTO W/O SCOPE: CPT | Performed by: FAMILY MEDICINE

## 2023-07-26 PROCEDURE — 99396 PREV VISIT EST AGE 40-64: CPT | Performed by: FAMILY MEDICINE

## 2023-07-26 RX ORDER — LOSARTAN POTASSIUM 50 MG/1
50 TABLET ORAL DAILY
Qty: 90 TABLET | Refills: 3 | Status: SHIPPED | OUTPATIENT
Start: 2023-07-26

## 2023-07-26 RX ORDER — MIRABEGRON 50 MG/1
TABLET, FILM COATED, EXTENDED RELEASE ORAL
COMMUNITY
Start: 2023-07-10

## 2023-07-26 NOTE — PROGRESS NOTES
[facility] HEALTH MAINTENANCE OFFICE VISIT  [facility] - [facility]    NAME: Jori Marley  AGE: 48 y.o. SEX: female  : 1970     DATE: 2023    Assessment and Plan     1. Annual physical exam  -     POCT urine dip auto non-scope  -     Lipase; Future  -     CBC; Future  -     Comprehensive metabolic panel; Future  -     Hemoglobin A1C; Future  -     Lipid Panel with Direct LDL reflex; Future  -     Magnesium; Future  -     TSH, 3rd generation; Future  -     Hepatitis C antibody; Future    2. Hypertension, unspecified type  -     Ambulatory Referral to Cardiology; Future  -     Comprehensive metabolic panel; Future  -     Magnesium; Future  -     Cortisol Level,7-9 AM Specimen; Future  -     losartan (COZAAR) 50 mg tablet; Take 1 tablet (50 mg total) by mouth daily  -     POCT ECG    3. History of COVID-19  -     Ambulatory Referral to Cardiology; Future    4. Overactive bladder  -     Comprehensive metabolic panel; Future    5. RECINOS (dyspnea on exertion)  -     Ambulatory Referral to Cardiology; Future    6. Hyperhidrosis  -     Cortisol Level,7-9 AM Specimen; Future  -     FSH and LH; Future    7. Bilateral hearing loss, unspecified hearing loss type    8. Screening for lipid disorders  -     Lipase; Future  -     Comprehensive metabolic panel; Future  -     Lipid Panel with Direct LDL reflex; Future    9. Screening for thyroid disorder  -     TSH, 3rd generation; Future    10. Screening for deficiency anemia  -     CBC; Future    11. Need for hepatitis C screening test  -     Hepatitis C antibody; Future    12. Menopausal syndrome (hot flushes)  -     FSH and LH; Future    13. BMI 32.0-32.9,adult  -     Ambulatory Referral to Cardiology; Future  -     Comprehensive metabolic panel; Future  -     Lipid Panel with Direct LDL reflex;  Future        Patient Counseling:   Nutrition: Stressed importance of a well balanced diet, moderation of sodium/saturated fat, caloric balance and sufficient intake of fiber  Exercise: Stressed the importance of regular exercise with a goal of 150 minutes per week  Dental Health: Discussed daily flossing and brushing and regular dental visits   Alcohol Use:  Recommended moderation of alcohol intake  Injury Prevention: Discussed Safety Belts, Safety Helmets, and Smoke Detectors    Immunizations reviewed: Risks and Benefits discussed  Discussed benefits of:  Colon Cancer Screening, Mammogram , Cervical Cancer screening and Screening labs. BMI Counseling: Body mass index is 32.25 kg/m². Discussed with patient's BMI with her. The BMI is above normal. Nutrition recommendations include consuming healthier snacks, moderation in carbohydrate intake, increasing intake of lean protein, reducing intake of saturated fat and trans fat and reducing intake of cholesterol. Exercise recommendations include exercising 3-5 times per week and strength training exercises.         Chief Complaint     Chief Complaint   Patient presents with   • Physical Exam     Patient is always hot and seems to sweat a lot        History of Present Illness     HPI    Well Adult Physical   Patient here for a comprehensive physical exam.      Diet and Physical Activity  Diet: well balanced diet  Exercise: intermittently      Depression Screen  PHQ-2/9 Depression Screening    Little interest or pleasure in doing things: 0 - not at all  Feeling down, depressed, or hopeless: 0 - not at all  PHQ-2 Score: 0  PHQ-2 Interpretation: Negative depression screen          General Health  Hearing: Hearing loss b/l--wears HA  Vision: goes for regular eye exams  Dental: regular dental visits    Reproductive Health  Follows with gynecologist--Dr. Ibrahima Higgins      The following portions of the patient's history were reviewed and updated as appropriate: allergies, current medications, past family history, past medical history, past social history, past surgical history and problem list.    Review of Systems     Review of Systems   Constitutional: Positive for fatigue. Negative for fever. HENT: Positive for hearing loss. Eyes: Negative. Respiratory:        RECINOS   Cardiovascular: Negative. Gastrointestinal: Negative. Endocrine:        Increased sweating   Genitourinary: Positive for frequency and urgency. Musculoskeletal: Negative. Skin: Negative. Allergic/Immunologic: Positive for environmental allergies. Neurological: Negative. Psychiatric/Behavioral: Negative.         Past Medical History     Past Medical History:   Diagnosis Date   • Asthma had a history as a a kid/can we chat   • Hard of hearing    • Hypertension    • Tympanic membrane perforation        Past Surgical History     Past Surgical History:   Procedure Laterality Date   • TUBAL LIGATION         Social History     Social History     Socioeconomic History   • Marital status: /Civil Union     Spouse name: Cassandra Ferreira   • Number of children: 2   • Years of education: None   • Highest education level: None   Occupational History   • None   Tobacco Use   • Smoking status: Never   • Smokeless tobacco: Never   Vaping Use   • Vaping Use: Never used   Substance and Sexual Activity   • Alcohol use: Not Currently     Comment: once a year maybe one wine cooler or glass of wine   • Drug use: Never   • Sexual activity: Yes     Partners: Male     Birth control/protection: Female Sterilization   Other Topics Concern   • None   Social History Narrative    Caffeine use- coffee     Social Determinants of Health     Financial Resource Strain: Not on file   Food Insecurity: Not on file   Transportation Needs: Not on file   Physical Activity: Not on file   Stress: Not on file   Social Connections: Not on file   Intimate Partner Violence: Not on file   Housing Stability: Not on file       Family History     Family History   Problem Relation Age of Onset   • Cancer Mother    • COPD Father    • Cancer Father    • Cancer Maternal Grandmother    • COPD Paternal Aunt    • Cancer Paternal Aunt    • Breast cancer Paternal Aunt    • Cancer Paternal Grandmother         Lung Cancer       Current Medications       Current Outpatient Medications:   •  losartan (COZAAR) 50 mg tablet, Take 1 tablet (50 mg total) by mouth daily, Disp: 90 tablet, Rfl: 3  •  Myrbetriq 50 MG TB24, TAKE 1 TABLET BY MOUTH EVERY DAY WITH WATER, Disp: , Rfl:      Allergies     Allergies   Allergen Reactions   • Penicillins Hives     Immunization History   Administered Date(s) Administered   • COVID-19 PFIZER VACCINE 0.3 ML IM 04/22/2021, 05/27/2021   • Zoster Vaccine Recombinant 08/01/2020, 09/18/2020     Objective     /94   Pulse 88   Temp 98.3 °F (36.8 °C)   Resp 16   Ht 5' 5" (1.651 m)   Wt 87.9 kg (193 lb 12.8 oz)   LMP 05/01/2022 (Approximate)   BMI 32.25 kg/m²      Physical Exam  Vitals and nursing note reviewed. Constitutional:       General: She is not in acute distress. Appearance: Normal appearance. HENT:      Ears:      Comments: Wears HA     Nose: Nose normal.      Mouth/Throat:      Pharynx: No oropharyngeal exudate or posterior oropharyngeal erythema. Eyes:      General:         Right eye: No discharge. Left eye: No discharge. Conjunctiva/sclera: Conjunctivae normal.   Cardiovascular:      Rate and Rhythm: Normal rate and regular rhythm. Pulmonary:      Effort: Pulmonary effort is normal.      Breath sounds: Normal breath sounds. Abdominal:      General: Bowel sounds are normal.      Palpations: Abdomen is soft. Tenderness: There is no abdominal tenderness. There is no right CVA tenderness, left CVA tenderness, guarding or rebound. Musculoskeletal:         General: Normal range of motion. Cervical back: Neck supple. No tenderness. Skin:     General: Skin is warm and dry. Coloration: Skin is not jaundiced. Neurological:      General: No focal deficit present.       Mental Status: She is alert and oriented to person, place, and time. Cranial Nerves: No cranial nerve deficit.    Psychiatric:         Mood and Affect: Mood normal.           Vision Screening    Right eye Left eye Both eyes   Without correction      With correction 20/25 20/20 20/15           Ronan Dee MD  [address] [place] [address]

## 2023-07-27 ENCOUNTER — APPOINTMENT (OUTPATIENT)
Dept: LAB | Facility: CLINIC | Age: 53
End: 2023-07-27
Payer: COMMERCIAL

## 2023-07-27 DIAGNOSIS — R61 GENERALIZED HYPERHIDROSIS: Primary | ICD-10-CM

## 2023-07-27 DIAGNOSIS — N95.1 MENOPAUSAL SYNDROME: ICD-10-CM

## 2023-07-27 LAB
ALBUMIN SERPL BCP-MCNC: 3.8 G/DL (ref 3.5–5)
ALP SERPL-CCNC: 100 U/L (ref 46–116)
ALT SERPL W P-5'-P-CCNC: 35 U/L (ref 12–78)
ANION GAP SERPL CALCULATED.3IONS-SCNC: 1 MMOL/L
AST SERPL W P-5'-P-CCNC: 25 U/L (ref 5–45)
BASOPHILS # BLD AUTO: 0.02 THOUSANDS/ÂΜL (ref 0–0.1)
BASOPHILS NFR BLD AUTO: 0 % (ref 0–1)
BILIRUB SERPL-MCNC: 0.51 MG/DL (ref 0.2–1)
BUN SERPL-MCNC: 13 MG/DL (ref 5–25)
CALCIUM SERPL-MCNC: 9.7 MG/DL (ref 8.3–10.1)
CHLORIDE SERPL-SCNC: 112 MMOL/L (ref 96–108)
CHOLEST SERPL-MCNC: 192 MG/DL
CO2 SERPL-SCNC: 31 MMOL/L (ref 21–32)
CORTIS AM PEAK SERPL-MCNC: 12.6 UG/DL (ref 6.7–22.6)
CREAT SERPL-MCNC: 0.94 MG/DL (ref 0.6–1.3)
EOSINOPHIL # BLD AUTO: 0.12 THOUSAND/ÂΜL (ref 0–0.61)
EOSINOPHIL NFR BLD AUTO: 3 % (ref 0–6)
ERYTHROCYTE [DISTWIDTH] IN BLOOD BY AUTOMATED COUNT: 13.2 % (ref 11.6–15.1)
EST. AVERAGE GLUCOSE BLD GHB EST-MCNC: 117 MG/DL
FSH SERPL-ACNC: 105.9 MIU/ML
GFR SERPL CREATININE-BSD FRML MDRD: 69 ML/MIN/1.73SQ M
GLUCOSE P FAST SERPL-MCNC: 104 MG/DL (ref 65–99)
HBA1C MFR BLD: 5.7 %
HCT VFR BLD AUTO: 45.1 % (ref 34.8–46.1)
HCV AB SER QL: NORMAL
HDLC SERPL-MCNC: 55 MG/DL
HGB BLD-MCNC: 14.4 G/DL (ref 11.5–15.4)
IMM GRANULOCYTES # BLD AUTO: 0.01 THOUSAND/UL (ref 0–0.2)
IMM GRANULOCYTES NFR BLD AUTO: 0 % (ref 0–2)
LDLC SERPL CALC-MCNC: 109 MG/DL (ref 0–100)
LH SERPL-ACNC: 60.8 MIU/ML
LIPASE SERPL-CCNC: 162 U/L (ref 73–393)
LYMPHOCYTES # BLD AUTO: 1.57 THOUSANDS/ÂΜL (ref 0.6–4.47)
LYMPHOCYTES NFR BLD AUTO: 34 % (ref 14–44)
MAGNESIUM SERPL-MCNC: 2.7 MG/DL (ref 1.6–2.6)
MCH RBC QN AUTO: 28 PG (ref 26.8–34.3)
MCHC RBC AUTO-ENTMCNC: 31.9 G/DL (ref 31.4–37.4)
MCV RBC AUTO: 88 FL (ref 82–98)
MONOCYTES # BLD AUTO: 0.4 THOUSAND/ÂΜL (ref 0.17–1.22)
MONOCYTES NFR BLD AUTO: 9 % (ref 4–12)
NEUTROPHILS # BLD AUTO: 2.47 THOUSANDS/ÂΜL (ref 1.85–7.62)
NEUTS SEG NFR BLD AUTO: 54 % (ref 43–75)
NRBC BLD AUTO-RTO: 0 /100 WBCS
PLATELET # BLD AUTO: 249 THOUSANDS/UL (ref 149–390)
PMV BLD AUTO: 10.3 FL (ref 8.9–12.7)
POTASSIUM SERPL-SCNC: 4.8 MMOL/L (ref 3.5–5.3)
PROT SERPL-MCNC: 7.4 G/DL (ref 6.4–8.4)
RBC # BLD AUTO: 5.15 MILLION/UL (ref 3.81–5.12)
SODIUM SERPL-SCNC: 144 MMOL/L (ref 135–147)
TRIGL SERPL-MCNC: 141 MG/DL
TSH SERPL DL<=0.05 MIU/L-ACNC: 3.18 UIU/ML (ref 0.45–4.5)
WBC # BLD AUTO: 4.59 THOUSAND/UL (ref 4.31–10.16)

## 2023-07-27 PROCEDURE — 83002 ASSAY OF GONADOTROPIN (LH): CPT

## 2023-07-27 PROCEDURE — 93000 ELECTROCARDIOGRAM COMPLETE: CPT | Performed by: FAMILY MEDICINE

## 2023-07-27 PROCEDURE — 85025 COMPLETE CBC W/AUTO DIFF WBC: CPT

## 2023-07-27 PROCEDURE — 80061 LIPID PANEL: CPT

## 2023-07-27 PROCEDURE — 84443 ASSAY THYROID STIM HORMONE: CPT

## 2023-07-27 PROCEDURE — 83036 HEMOGLOBIN GLYCOSYLATED A1C: CPT

## 2023-07-27 PROCEDURE — 83001 ASSAY OF GONADOTROPIN (FSH): CPT

## 2023-07-27 PROCEDURE — 83690 ASSAY OF LIPASE: CPT

## 2023-07-27 PROCEDURE — 86803 HEPATITIS C AB TEST: CPT

## 2023-07-27 PROCEDURE — 80053 COMPREHEN METABOLIC PANEL: CPT

## 2023-07-27 PROCEDURE — 36415 COLL VENOUS BLD VENIPUNCTURE: CPT

## 2023-07-27 PROCEDURE — 82533 TOTAL CORTISOL: CPT

## 2023-07-27 PROCEDURE — 83735 ASSAY OF MAGNESIUM: CPT

## 2023-08-07 ENCOUNTER — PATIENT MESSAGE (OUTPATIENT)
Dept: OBGYN CLINIC | Facility: CLINIC | Age: 53
End: 2023-08-07

## 2023-08-07 DIAGNOSIS — B37.31 VAGINAL YEAST INFECTION: Primary | ICD-10-CM

## 2023-08-07 RX ORDER — FLUCONAZOLE 150 MG/1
150 TABLET ORAL
Qty: 2 TABLET | Refills: 0 | Status: SHIPPED | OUTPATIENT
Start: 2023-08-07 | End: 2023-08-11

## 2023-08-10 DIAGNOSIS — I10 HYPERTENSION, UNSPECIFIED TYPE: Primary | ICD-10-CM

## 2023-08-10 RX ORDER — LOSARTAN POTASSIUM 25 MG/1
25 TABLET ORAL DAILY
Qty: 90 TABLET | Refills: 1 | Status: SHIPPED | OUTPATIENT
Start: 2023-08-10

## 2023-09-06 ENCOUNTER — HOSPITAL ENCOUNTER (OUTPATIENT)
Dept: RADIOLOGY | Facility: HOSPITAL | Age: 53
Discharge: HOME | End: 2023-09-06
Payer: COMMERCIAL

## 2023-09-06 DIAGNOSIS — Z12.31 SCREENING MAMMOGRAM, ENCOUNTER FOR: ICD-10-CM

## 2023-09-06 PROCEDURE — 77067 SCR MAMMO BI INCL CAD: CPT

## 2023-09-06 PROCEDURE — 77063 BREAST TOMOSYNTHESIS BI: CPT

## 2023-09-13 ENCOUNTER — OFFICE VISIT (OUTPATIENT)
Dept: FAMILY MEDICINE CLINIC | Facility: CLINIC | Age: 53
End: 2023-09-13
Payer: COMMERCIAL

## 2023-09-13 VITALS
TEMPERATURE: 98.7 F | HEIGHT: 65 IN | BODY MASS INDEX: 32.79 KG/M2 | SYSTOLIC BLOOD PRESSURE: 138 MMHG | WEIGHT: 196.8 LBS | RESPIRATION RATE: 16 BRPM | HEART RATE: 68 BPM | DIASTOLIC BLOOD PRESSURE: 88 MMHG

## 2023-09-13 DIAGNOSIS — I10 HYPERTENSION, UNSPECIFIED TYPE: Primary | ICD-10-CM

## 2023-09-13 DIAGNOSIS — H91.90 HEARING LOSS, UNSPECIFIED HEARING LOSS TYPE, UNSPECIFIED LATERALITY: ICD-10-CM

## 2023-09-13 DIAGNOSIS — N32.81 OVERACTIVE BLADDER: ICD-10-CM

## 2023-09-13 PROCEDURE — 99214 OFFICE O/P EST MOD 30 MIN: CPT | Performed by: FAMILY MEDICINE

## 2023-09-24 PROBLEM — Z11.59 NEED FOR HEPATITIS C SCREENING TEST: Status: RESOLVED | Noted: 2023-07-26 | Resolved: 2023-09-24

## 2023-09-24 PROBLEM — Z13.0 SCREENING FOR DEFICIENCY ANEMIA: Status: RESOLVED | Noted: 2023-07-26 | Resolved: 2023-09-24

## 2023-10-11 NOTE — PROGRESS NOTES
Assessment/Plan:    1. Hypertension, unspecified type  Assessment & Plan:  Cont. Losartan  DASH diet  Maintain yearly eye exam.      2. Overactive bladder  Assessment & Plan:  on Mybetriq  Hx bladder mesh sling surgery      3. Hearing loss, unspecified hearing loss type, unspecified laterality  Assessment & Plan:  Wears HA      4. BMI 32.0-32.9,adult                Subjective:      Patient ID: Jaylyn Tai is a 48 y.o. female. Chief Complaint   Patient presents with   • Results     labs       HPI     follow-up  Interval hx reviewed  Recent labs essentially wnl  hga1c just into predm=5.7%  BP borderline high -  overall feeling well--no acute c/o  Gyn care utd    The following portions of the patient's history were reviewed and updated as appropriate: allergies, current medications, past family history, past medical history, past social history, past surgical history and problem list.  Past Medical History:   Diagnosis Date   • Asthma had a history as a a kid/can we chat   • Hard of hearing    • HL (hearing loss)    • Hypertension    • Tympanic membrane perforation      Past Surgical History:   Procedure Laterality Date   • INCONTINENCE SURGERY      bladder mesh sling   • TUBAL LIGATION       Review of Systems   Constitutional: Positive for fatigue. Negative for fever. HENT: Positive for hearing loss. Eyes: Negative. Respiratory:        RECINOS   Cardiovascular: Negative. Gastrointestinal: Negative. Endocrine:        Increased sweating   Genitourinary: Positive for frequency and urgency. Musculoskeletal: Negative. Skin: Negative. Allergic/Immunologic: Positive for environmental allergies. Neurological: Negative. Psychiatric/Behavioral: Negative.           Current Outpatient Medications   Medication Sig Dispense Refill   • losartan (COZAAR) 50 mg tablet Take 1 tablet (50 mg total) by mouth daily 90 tablet 3   • Myrbetriq 50 MG TB24 TAKE 1 TABLET BY MOUTH EVERY DAY WITH WATER       No current facility-administered medications for this visit. Objective:    /88 (BP Location: Left arm, Patient Position: Sitting, Cuff Size: Large)   Pulse 68   Temp 98.7 °F (37.1 °C)   Resp 16   Ht 5' 5" (1.651 m)   Wt 89.3 kg (196 lb 12.8 oz)   LMP 05/01/2022 (Approximate)   BMI 32.75 kg/m²        Physical Exam  Vitals and nursing note reviewed. Constitutional:       General: She is not in acute distress. Appearance: Normal appearance. HENT:      Ears:      Comments: Wears HA     Nose: Nose normal.      Mouth/Throat:      Pharynx: No oropharyngeal exudate or posterior oropharyngeal erythema. Eyes:      General:         Right eye: No discharge. Left eye: No discharge. Conjunctiva/sclera: Conjunctivae normal.   Cardiovascular:      Rate and Rhythm: Normal rate and regular rhythm. Pulmonary:      Effort: Pulmonary effort is normal.      Breath sounds: Normal breath sounds. Abdominal:      General: Bowel sounds are normal.      Palpations: Abdomen is soft. Tenderness: There is no abdominal tenderness. There is no right CVA tenderness, left CVA tenderness, guarding or rebound. Musculoskeletal:         General: Normal range of motion. Cervical back: Neck supple. No tenderness. Skin:     General: Skin is warm and dry. Coloration: Skin is not jaundiced. Neurological:      General: No focal deficit present. Mental Status: She is alert and oriented to person, place, and time. Cranial Nerves: No cranial nerve deficit.    Psychiatric:         Mood and Affect: Mood normal.              Siomara Hoffman MD

## 2023-10-27 DIAGNOSIS — B37.31 YEAST VAGINITIS: Primary | ICD-10-CM

## 2023-10-27 RX ORDER — FLUCONAZOLE 150 MG/1
150 TABLET ORAL ONCE
Qty: 2 TABLET | Refills: 0 | Status: SHIPPED | OUTPATIENT
Start: 2023-10-27 | End: 2023-10-27

## 2023-11-06 ENCOUNTER — OFFICE VISIT (OUTPATIENT)
Dept: OBGYN CLINIC | Facility: CLINIC | Age: 53
End: 2023-11-06
Payer: COMMERCIAL

## 2023-11-06 ENCOUNTER — APPOINTMENT (OUTPATIENT)
Dept: RADIOLOGY | Facility: AMBULARY SURGERY CENTER | Age: 53
End: 2023-11-06
Attending: STUDENT IN AN ORGANIZED HEALTH CARE EDUCATION/TRAINING PROGRAM
Payer: COMMERCIAL

## 2023-11-06 VITALS
HEART RATE: 66 BPM | WEIGHT: 196.8 LBS | BODY MASS INDEX: 32.79 KG/M2 | HEIGHT: 65 IN | SYSTOLIC BLOOD PRESSURE: 161 MMHG | DIASTOLIC BLOOD PRESSURE: 97 MMHG

## 2023-11-06 DIAGNOSIS — M25.561 RIGHT KNEE PAIN, UNSPECIFIED CHRONICITY: ICD-10-CM

## 2023-11-06 DIAGNOSIS — M54.16 RADICULOPATHY, LUMBAR REGION: Primary | ICD-10-CM

## 2023-11-06 PROCEDURE — 99204 OFFICE O/P NEW MOD 45 MIN: CPT | Performed by: STUDENT IN AN ORGANIZED HEALTH CARE EDUCATION/TRAINING PROGRAM

## 2023-11-06 PROCEDURE — 73562 X-RAY EXAM OF KNEE 3: CPT

## 2023-11-06 RX ORDER — METHYLPREDNISOLONE 4 MG/1
TABLET ORAL
Qty: 21 TABLET | Refills: 0 | Status: SHIPPED | OUTPATIENT
Start: 2023-11-06

## 2023-11-06 NOTE — PROGRESS NOTES
Orthopaedics Office Visit -new patient Visit    ASSESSMENT/PLAN:    Assessment:   #1 mild right knee osteoarthritis  2. New onset right lower extremity radiculopathy    Plan:     Right knee radiograph reviewed with patient displaying mild to moderate degenerative changes most notably in the medial femoral compartment. No acute fractures or dislocations. No osseous lesions  Patient's main complaint is of new onset paresthesias going down the right leg. These are coming from the posterior lateral aspect of the knee traveling down to the foot. There is no injury associated with the onset of the symptoms. Consistent with new onset acute exacerbation of lumbar radiculopathy. Patient has no red flags associated with low back pain including urinary or bowel incontinence, weight loss, weight gain, traumatic injury or weakness. Medrol dose pack prescribed to try to decrease the inflammation at the level of the nerve root. Start physical therapy directed at right lower extremity lumbar radiculopathy  Referral to Spine and Pain provided for continued management if the acute exacerbation is not self-limited  Patient does not wish to undergo any treatment for right knee osteoarthritis at this time  The patient can follow up as needed and is welcome to return at any point with any new or old issue. To Do Next Visit:  PRN    _____________________________________________________  CHIEF COMPLAINT:  Chief Complaint   Patient presents with    Right Knee - Pain         SUBJECTIVE:  Kinza Santiago is a 48 y.o. female who presents for initial evaluation of right knee. She has had symptoms for years and mentions an event 3 days ago in which she had the right knee give out from under her. Today she complains of right anterior generalized knee pain with right plantar foot tinging. She rates her symptoms at 9/10. Transitional positions aggravate while rest alleviates. She has used IBU with benefit.   She denies past injections or surgery of knee, hip or spine. She denies kidney disease, recent bowel or bladder changes. Patient states that she always has chronic baseline right knee pain which is mild in nature and was concerned due to the new onset of paresthesias in the lower extremity. This was not associated with a fall or other traumatic injury or twisting injury. The patient has no perceived weakness in the lower extremities. No discernible numbness in the lower extremity. No low back pain. PAST MEDICAL HISTORY:  Past Medical History:   Diagnosis Date    Asthma had a history as a a kid/can we chat    Hard of hearing     HL (hearing loss)     Hypertension     Tympanic membrane perforation        PAST SURGICAL HISTORY:  Past Surgical History:   Procedure Laterality Date    INCONTINENCE SURGERY      bladder mesh sling    TUBAL LIGATION         FAMILY HISTORY:  Family History   Problem Relation Age of Onset    Cancer Mother     COPD Father     Cancer Father     Cancer Maternal Grandmother     COPD Paternal Aunt     Cancer Paternal Aunt     Breast cancer Paternal Aunt     Cancer Paternal Grandmother         Lung Cancer       SOCIAL HISTORY:  Social History     Tobacco Use    Smoking status: Never    Smokeless tobacco: Never   Vaping Use    Vaping Use: Never used   Substance Use Topics    Alcohol use: Not Currently     Comment: once a year maybe one wine cooler or glass of wine    Drug use: Never       MEDICATIONS:    Current Outpatient Medications:     losartan (COZAAR) 50 mg tablet, Take 1 tablet (50 mg total) by mouth daily, Disp: 90 tablet, Rfl: 3    Myrbetriq 50 MG TB24, TAKE 1 TABLET BY MOUTH EVERY DAY WITH WATER, Disp: , Rfl:     ALLERGIES:  Allergies   Allergen Reactions    Penicillins Hives       REVIEW OF SYSTEMS:  MSK: Right knee pain  Neuro: Paresthesias right lower extremity  Pertinent items are otherwise noted in HPI. A comprehensive review of systems was otherwise negative.     LABS:  HgA1c:   Lab Results Component Value Date    HGBA1C 5.7 (H) 07/27/2023     BMP:   Lab Results   Component Value Date    CALCIUM 9.7 07/27/2023    K 4.8 07/27/2023    CO2 31 07/27/2023     (H) 07/27/2023    BUN 13 07/27/2023    CREATININE 0.94 07/27/2023     CBC: No components found for: "CBC"    _____________________________________________________  PHYSICAL EXAMINATION:  Vital signs: /97 (BP Location: Right arm, Patient Position: Sitting, Cuff Size: Standard)   Pulse 66   Ht 5' 5" (1.651 m)   Wt 89.3 kg (196 lb 12.8 oz)   LMP 05/01/2022 (Approximate)   BMI 32.75 kg/m²   General: No acute distress, awake and alert  Psychiatric: Mood and affect appear appropriate  HEENT: Trachea Midline, No torticollis, no apparent facial trauma  Cardiovascular: No audible murmurs; Extremities appear perfused  Pulmonary: No audible wheezing or stridor  Skin: No open lesions; see further details (if any) below    MUSCULOSKELETAL EXAMINATION:  Extremities: The right lower extremity was exposed inspected. The patient's right knee was exposed there is no lacerations or abrasions. Patient has no knee effusion. The patient has full intact range of motion of the knee from 0 to 120 degrees of flexion. No pain or crepitus throughout range of motion. Patient has no medial or lateral joint line tenderness. Negative anterior posterior drawer testing. No laxity with varus and valgus stress at 0 and 30 degrees. The patient has 5 out of 5 muscle strength preserved with knee flexion, knee extension, hip flexion, hip extension, ankle dorsiflexion, ankle plantarflexion. When trying to compare the contralateral lower extremity sensation the patient perceives no differences. Her sensation is intact in all dermatomal distributions from L2-S1. No saddle anesthesia.   Not able to reproduce her symptoms with straight leg raise.      _____________________________________________________  STUDIES REVIEWED:  I personally reviewed the images and interpretation is as follows:  Right knee x-ray:  Mild medial and patellofemoral arthritic changes with decreased joint space, subchondral sclerosis and osteophyte formation.     PROCEDURES PERFORMED:  Procedures    Junious Bones

## 2023-11-17 ENCOUNTER — CONSULT (OUTPATIENT)
Dept: CARDIOLOGY CLINIC | Facility: CLINIC | Age: 53
End: 2023-11-17
Payer: COMMERCIAL

## 2023-11-17 VITALS
HEART RATE: 70 BPM | DIASTOLIC BLOOD PRESSURE: 90 MMHG | BODY MASS INDEX: 31.65 KG/M2 | OXYGEN SATURATION: 99 % | SYSTOLIC BLOOD PRESSURE: 140 MMHG | WEIGHT: 190 LBS | HEIGHT: 65 IN

## 2023-11-17 DIAGNOSIS — Z86.16 HISTORY OF COVID-19: ICD-10-CM

## 2023-11-17 DIAGNOSIS — R07.2 PRECORDIAL PAIN: ICD-10-CM

## 2023-11-17 DIAGNOSIS — I10 HYPERTENSION, UNSPECIFIED TYPE: Primary | ICD-10-CM

## 2023-11-17 DIAGNOSIS — R06.09 DOE (DYSPNEA ON EXERTION): ICD-10-CM

## 2023-11-17 PROCEDURE — 99203 OFFICE O/P NEW LOW 30 MIN: CPT | Performed by: INTERNAL MEDICINE

## 2023-11-17 PROCEDURE — 93000 ELECTROCARDIOGRAM COMPLETE: CPT | Performed by: INTERNAL MEDICINE

## 2023-11-17 RX ORDER — LOSARTAN POTASSIUM 100 MG/1
100 TABLET ORAL DAILY
Qty: 90 TABLET | Refills: 2 | Status: SHIPPED | OUTPATIENT
Start: 2023-11-17

## 2023-11-20 ENCOUNTER — TELEPHONE (OUTPATIENT)
Dept: PHYSICAL THERAPY | Facility: CLINIC | Age: 53
End: 2023-11-20

## 2023-11-20 ENCOUNTER — OFFICE VISIT (OUTPATIENT)
Dept: URGENT CARE | Facility: CLINIC | Age: 53
End: 2023-11-20
Payer: COMMERCIAL

## 2023-11-20 ENCOUNTER — APPOINTMENT (OUTPATIENT)
Dept: PHYSICAL THERAPY | Facility: CLINIC | Age: 53
End: 2023-11-20
Payer: COMMERCIAL

## 2023-11-20 VITALS — OXYGEN SATURATION: 96 % | RESPIRATION RATE: 18 BRPM | HEART RATE: 86 BPM | TEMPERATURE: 98.1 F

## 2023-11-20 DIAGNOSIS — J06.9 VIRAL UPPER RESPIRATORY TRACT INFECTION: Primary | ICD-10-CM

## 2023-11-20 PROCEDURE — 99213 OFFICE O/P EST LOW 20 MIN: CPT | Performed by: PHYSICIAN ASSISTANT

## 2023-11-20 NOTE — PROGRESS NOTES
[facility]        NAME: Patricia Sigala is a 48 y.o. female  : 1970    MRN: [id number]  DATE: 2023  TIME: 1:35 PM    Assessment and Plan   Viral upper respiratory tract infection [J06.9]  1. Viral upper respiratory tract infection          Patient Instructions   Viral upper respiratory infection  Recommend flonase nasal spray for postnasal drip/cough  Warm salt water gargles  Rest, fluids and supportive care  May benefit from a cool mist humidifier on night stand  Tylenol/ibuprofen as needed for pain/fever    Follow up with PCP in 3-5 days. Proceed to  ER if symptoms worsen. Chief Complaint     Chief Complaint   Patient presents with    Sinusitis     Patient reports she had cold symptoms develop on Saturday, but started yesterday with sinus pain and pressure all the the left side. Denies any fever. History of Present Illness       Lit Jimenez is a 63-year-old female who presents to the clinic complaining of cold symptoms x3 days. She states it started with bilateral sinus maxillary pain and pressure and now it today is worsening. She is also complaining of rhinorrhea, headache, fatigue, left-sided sore throat and left-sided ear pain. She also states the left side of her sinuses is worse than the right. She denies any fever, chills, myalgias, cough, shortness of breath, nausea, vomiting, diarrhea, loss of taste or smell, recent travel, or exposure to anyone known COVID-positive. Review of Systems   Review of Systems   Constitutional:  Negative for chills, fatigue and fever. HENT:  Positive for ear pain, rhinorrhea, sinus pressure, sinus pain and sore throat. Negative for congestion. Respiratory:  Negative for cough and shortness of breath. Gastrointestinal:  Negative for diarrhea, nausea and vomiting. Musculoskeletal:  Negative for myalgias. Neurological:  Positive for headaches. Negative for dizziness.          Current Medications       Current Outpatient Medications:     losartan (COZAAR) 100 MG tablet, Take 1 tablet (100 mg total) by mouth daily, Disp: 90 tablet, Rfl: 2    Myrbetriq 50 MG TB24, TAKE 1 TABLET BY MOUTH EVERY DAY WITH WATER, Disp: , Rfl:     methylPREDNISolone 4 MG tablet therapy pack, Use as directed on package (Patient not taking: Reported on 11/17/2023), Disp: 21 tablet, Rfl: 0    Current Allergies     Allergies as of 11/20/2023 - Reviewed 11/20/2023   Allergen Reaction Noted    Penicillins Hives 03/12/2013            The following portions of the patient's history were reviewed and updated as appropriate: allergies, current medications, past family history, past medical history, past social history, past surgical history and problem list.     Past Medical History:   Diagnosis Date    Asthma had a history as a a kid/can we chat    Hard of hearing     HL (hearing loss)     Hypertension     Tympanic membrane perforation        Past Surgical History:   Procedure Laterality Date    INCONTINENCE SURGERY      bladder mesh sling    TUBAL LIGATION         Family History   Problem Relation Age of Onset    Cancer Mother     COPD Father     Cancer Father     Cancer Maternal Grandmother     COPD Paternal Aunt     Cancer Paternal Aunt     Breast cancer Paternal Aunt     Cancer Paternal Grandmother         Lung Cancer         Medications have been verified. Objective   Pulse 86   Temp 98.1 °F (36.7 °C)   Resp 18   LMP 05/01/2022 (Approximate)   SpO2 96%   Patient's last menstrual period was 05/01/2022 (approximate). Physical Exam     Physical Exam  Vitals and nursing note reviewed. Constitutional:       General: She is not in acute distress. Appearance: Normal appearance. She is not ill-appearing. HENT:      Right Ear: Tympanic membrane, ear canal and external ear normal.      Left Ear: Tympanic membrane, ear canal and external ear normal.      Nose: Rhinorrhea present.       Right Sinus: No maxillary sinus tenderness or frontal sinus tenderness. Left Sinus: No maxillary sinus tenderness or frontal sinus tenderness. Mouth/Throat:      Mouth: Mucous membranes are moist.      Pharynx: No oropharyngeal exudate or posterior oropharyngeal erythema. Cardiovascular:      Rate and Rhythm: Normal rate and regular rhythm. Heart sounds: Normal heart sounds. Pulmonary:      Effort: Pulmonary effort is normal.      Breath sounds: Normal breath sounds. Lymphadenopathy:      Cervical: No cervical adenopathy. Neurological:      Mental Status: She is alert and oriented to person, place, and time.    Psychiatric:         Mood and Affect: Mood normal.         Behavior: Behavior normal.

## 2023-11-20 NOTE — TELEPHONE ENCOUNTER
Pt left an internal message to cancel her IE appt today due to being sick. Pt has been rescheduled to 11/27/2023.      Orlando Sampson, PT, DPT

## 2023-11-24 ENCOUNTER — TELEPHONE (OUTPATIENT)
Dept: PHYSICAL THERAPY | Facility: CLINIC | Age: 53
End: 2023-11-24

## 2023-11-24 NOTE — TELEPHONE ENCOUNTER
Pt left an internal message to cancel her IE for 11/27 due to a work conflict. (2nd cancel).  Changed her next appt (11/30/2023) to an IE.     Griselda Valentino, PT, DPT

## 2023-11-27 ENCOUNTER — APPOINTMENT (OUTPATIENT)
Dept: PHYSICAL THERAPY | Facility: CLINIC | Age: 53
End: 2023-11-27
Payer: COMMERCIAL

## 2023-11-28 ENCOUNTER — TELEPHONE (OUTPATIENT)
Dept: PHYSICAL THERAPY | Facility: CLINIC | Age: 53
End: 2023-11-28

## 2023-11-29 ENCOUNTER — TELEPHONE (OUTPATIENT)
Dept: PHYSICAL THERAPY | Facility: CLINIC | Age: 53
End: 2023-11-29

## 2023-11-29 NOTE — TELEPHONE ENCOUNTER
Pt did not return call from yesterday, so [address] called and s/w pt confirming her appt for tomorrow evening.

## 2023-11-30 ENCOUNTER — EVALUATION (OUTPATIENT)
Dept: PHYSICAL THERAPY | Facility: CLINIC | Age: 53
End: 2023-11-30
Payer: COMMERCIAL

## 2023-11-30 DIAGNOSIS — M54.16 RADICULOPATHY, LUMBAR REGION: ICD-10-CM

## 2023-11-30 DIAGNOSIS — M25.561 RIGHT KNEE PAIN, UNSPECIFIED CHRONICITY: Primary | ICD-10-CM

## 2023-11-30 PROCEDURE — 97162 PT EVAL MOD COMPLEX 30 MIN: CPT

## 2023-11-30 PROCEDURE — 97110 THERAPEUTIC EXERCISES: CPT

## 2023-11-30 NOTE — PROGRESS NOTES
PT Evaluation     Today's date: 2023  Patient name: Jen Welsh  : 1970  MRN: [id number]  Referring provider: Donavan Vera DO  Dx:   Encounter Diagnosis     ICD-10-CM    1. Right knee pain, unspecified chronicity  M25.561       2. Radiculopathy, lumbar region  M54.16                      Assessment  Assessment details: Jen Welsh is a 48 y.o. female who presents with R knee pain for 4-5 years w/ new onset of numbness and tingling into R foot in November which has improved w/ course of oral steroid. Pt presents with R tibiofemoral joint swelling and impaired R knee AROM, R knee strength, R patella mobility, and RLE balance. No hx of back pain or pain proximal to R knee, lumbar AROM WNL and no effect on repeated lumbar extension in lying. Pt presents with a positive Des on R and pain w/ Thessaly of RLE. Due to these impairments, patient has difficulty performing sit to stand transfers, floor to stand transfers, and navigating stairs affecting her ability to get out of her chair and clean her home. Patient's clinical presentation is consistent with internal derangement of R knee and their referring diagnosis of Right knee pain, unspecified chronicity  (primary encounter diagnosis). Patient has been educated in pathology, review of impairements, prognosis, activity modification, to contact MD if pt notes coldness of RLE, POC, and HEP. Patient would benefit from skilled physical therapy services to address their aforementioned functional limitations and progress towards prior level of function and independence with home exercise program.       Impairments: abnormal gait, abnormal or restricted ROM, activity intolerance, impaired physical strength, lacks appropriate home exercise program, pain with function, weight-bearing intolerance and poor body mechanics    Goals  Short Term Goals: Target Date 4 weeks (2023)  1.  Pt will be independent in HEP in order to demonstrate compliance and active participation in recovery. 2 Improve supine AROM R knee within 5 degrees contralateral LE in order to prepare for reciprocal stairs. 3. Improve R LE strength to greater than or equal to 4+/5 to improve sit to stand transfer. 4. Pt will present w/ R girth measurement (tibial plateau and superior border of patella) within 1.5 cm contralateral LE to demonstrate appropriate swelling management techniques/ normalize ROM. Long Term Goals: Target Date 12 weeks (2/22/2024)  1. Pt to tolerate 1 FOS w/ reciprocal pattern w handrail and less than 2/10 pain in order to get to her bedroom. 2. Pt will be able to complete floor to stand transfer without UE assistance and less than 2/10 pain in order to return to cleaning her floors. 3. Improve balance such that pt can maintain R SLS x 30" to decrease fall risk. 4. Pt will demo R SLR w/ 0 deg knee ext to demo improved knee AROM and quad strength necessary for functional transfers. Plan  Plan details: HEP development and progression, monitor patients adherence to activity modification to ensure adequate healing time/ recovery. Implement stretching, A/AA/PROM, joint mobilizations, posture education, STM/MI as needed to reduce muscle tension, muscle reeducation. Progress strengthening; improve pt's tolerance to resisted WB activities; implement functional strengthening. POC discussed and agreed upon with patient.    Patient would benefit from: PT eval and skilled physical therapy  Planned modality interventions: cryotherapy, thermotherapy: hydrocollator packs and unattended electrical stimulation  Planned therapy interventions: manual therapy, neuromuscular re-education, therapeutic activities, therapeutic exercise, home exercise program, patient education, stretching, functional ROM exercises and balance/weight bearing training  Frequency: 2x week  Duration in weeks: 12  Plan of Care beginning date: 11/30/2023  Plan of Care expiration date: 2024  Treatment plan discussed with: patient        Subjective Evaluation    History of Present Illness  Mechanism of injury: Pt is a 48 y.o. female who presents to PT w/ complaint of R knee pain. Pt has been experiencing R knee pain for 4-5 years. Reports experiencing pain and swelling that comes and goes which she has managed with rest, Advil, and elevation. Pt states that at the beginning of November she experienced R knee instability and felt as though she was going to fall in the grocery store; experienced numbness and tingling as well which was new and the reason for pt's visit to MD. No hx of knee injury; no hx of back pain. Denies hx of LLE injury as well. Denies back pain and thigh pain. Pain located along anterior tibiofemoral joint. Numbness and tingling resolved since oral steroid medication prescribed to her on the 2023 for 7 day course. Pt is a  for [employer]. She denies pain with sitting; only experiences pain when going to cross her R leg over contralateral LE. Pt denies sensation of coldness in R foot. Pt has not had PT previously. 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 Pain  Current pain ratin  At best pain ratin (With advil)  At worst pain rating: 10 (Walking up and down stairs)  Quality: throbbing  Relieving factors: rest (Wearing compression knee brace)  Aggravating factors: stair climbing, walking and lifting (STS and car transfers, squating, floor to stand transfers)    Social Support  Steps to enter house: yes (uni rail)  2  Lives in: multiple-level home (w/ uni rail)  Lives with: adult children      Diagnostic Tests  Abnormal x-ray: 2023 R knee: No acute osseous abnormality. .  Treatments  Previous treatment: medication  Current treatment: physical therapy      Objective  POSTURE:  2023: Standing posture demonstrates increased lumbar lordosis; no gross lateral shift. Balance:   2023  Tandem R post w/ EO: >30 sec  Tandem L post  w/ EO: >30 sec  SLS R w/ EO:  23.7 sec  SLS L w/ EO:  >30 sec      LUMBAR AROM: 2023  Flexion   nil carlson  Extension  nil carlson  R sideglide  nil carlson  L sideglide  nil carlson      DERMATOMAL TESTIN2023  L2 anterior thigh:  RLE intact to light touch  L3 medial knee:  RLE intact to light touch  L4 medial maleolus:  RLE intact to light touch  L5 1st web space:  RLE intact to light touch  S1 lateral/sole foot:  RLE intact to light touch  S2 poster calf:  RLE intact to light touch    MYOTOMAL TESTIN2023     Right  Left  L2-3 Hip flexion:  5/5  5/5  L3-4 Knee extension:  5/  5/5  L5 Great toe exten:  4/5  4/5  L4 Heel walk/DF:  5/  5/5  S1 toe walk/PF/ever:  /  5/5  S2 knee flex:   /  5/5    MMT:    R  L     2023  Knee flex  4/5  5/5  Knee exten  /  5/5  Hip flexion  /  5/5  Hip ER   NT  NT  Hip IR   NT  NT  Hip exten  /  4/5    REFLEXES:     2023  L3-4 Quadriceps:  2+ bilaterally  L5-S1 Achilles:  2+bilaterally      MECHANICAL ASSESSMENT:   2023  Pre-test findings include: R anterior knee pain, 5/10  Repeated extension in lying (REIL) 1x10 = NE    AROM:   R  L      2023  Knee flex sup/prone  135 /120 135/120  Knee ext Qset/SLR  -10/12  0/ 0    Girth/Circumference:  2023:    Tibial plateau: R 13.0GG, L 35cm   Superior patella border: R 43cm, L 40.5cm    FLEXIBILITY:  2023  Hamstring flexibility 80 L, 80 R          Quad flexibility mi- mod restriction bilaterally     Palpation:  2023: Pt denies TTP along anterior/medial/lateral/posterior knee. Knee w/ mild increase in warmth compared to contralateral LE; swelling of R tibiofemoral jt noted- refer to girth measurement above. No redness noted.      Patella mobility:  2023: R superior glide WNL, medial/lateral/ inferior w/ mod hypomobility. L patella mobility WNL    SPECIAL TESTS     11/30/2023  SLR supine:   (- R/- L)  Crossed SLR:   (- R/- L)  Des's   (+ R/- L) - pt reports mild n/t into foot following test  Thessaly  (+ R for pain/ no clicking noted by pt/- L)  Lachman (anterior) (- R/- L)  Lachman (posterior) (- R/- L)  Anterior drawer (- R/- L)  Posterior drawer (- R/- L)  Valgus stress test (- R/- L)  Varus stress test (- R/- L)    FUNCTION:  11/30/2023  Stairs are step-to w/ use of rail; most painful descending reciprocal   Squat: NT     Precautions:   Past Medical History:   Diagnosis Date    Asthma had a history as a a kid/can we chat    Hard of hearing     HL (hearing loss)     Hypertension     Tympanic membrane perforation      Past Surgical History:   Procedure Laterality Date    INCONTINENCE SURGERY      bladder mesh sling    TUBAL LIGATION       SOC: 11/30/2023  FOTO: 11/30/2023  POC Expiration: 2/22/2023  Daily Treatment Log  Date: Initial Evaluation  11/30/2023 Next session         Visit#/ auth: 1           Objective Measures                           Manuals             STM w/ RLE elevated + active ankle pumps             KT tape for swelling management                                         Neuro Re-Ed                                                                                                               Ther Ex  10'           Hamstring stretch             Quad stretch             Hip flexor stretch             Quad set             Heel slides (active)             Clamshell        Bridge             Standing hip extension             EDUCATION: Pathology, review of impairements, prognosis, activity modification, POC, and HEP  KE; q-set demo/ trial           Ther Activity                                         Gait Training                                         Modalities              ICE                           HEP:   Access Code: [email or web address]  URL: [email or web address]  Date: 11/30/2023  Prepared by: Hung Kern    Exercises  - Supine Quad Set  - 2 x daily - 1 sets - 10 reps - 5 sec hold  - Supine Single Leg Ankle Pumps  - 2 x daily - 2 sets - 20 reps

## 2023-12-04 ENCOUNTER — OFFICE VISIT (OUTPATIENT)
Dept: PHYSICAL THERAPY | Facility: CLINIC | Age: 53
End: 2023-12-04
Payer: COMMERCIAL

## 2023-12-04 DIAGNOSIS — M25.561 RIGHT KNEE PAIN, UNSPECIFIED CHRONICITY: Primary | ICD-10-CM

## 2023-12-04 DIAGNOSIS — M54.16 RADICULOPATHY, LUMBAR REGION: ICD-10-CM

## 2023-12-04 PROCEDURE — 97110 THERAPEUTIC EXERCISES: CPT

## 2023-12-04 NOTE — PROGRESS NOTES
Daily Note     Today's date: 2023  Patient name: Kieran Mueller  : 1970  MRN: [id number]  Referring provider: Judah Rodriguez DO  Dx:   Encounter Diagnosis     ICD-10-CM    1. Right knee pain, unspecified chronicity  M25.561       2. Radiculopathy, lumbar region  M54.16                      Subjective: Pt states that tingling remaining in her toes since IE. Objective: See treatment diary below    Mechanical assessment:     Assessment: Pt continues to present with RLE swelling. Demonstrated min to nil quad restriction with prone quadriceps stretch. Pt reports improved numbness/ tingling into R toes with repeated knee extension with heel elevated on stool. HEP updated and reviewed. Tolerated treatment well. Patient would benefit from continued PT. Plan: Continue per plan of care. Progress treatment as tolerated.        Precautions:   Past Medical History:   Diagnosis Date    Asthma had a history as a a kid/can we chat    Hard of hearing     HL (hearing loss)     Hypertension     Tympanic membrane perforation      Past Surgical History:   Procedure Laterality Date    INCONTINENCE SURGERY      bladder mesh sling    TUBAL LIGATION       SOC: 2023  FOTO: 2023  POC Expiration: 2023  Daily Treatment Log  Date: Initial Evaluation  2023         Visit#/ auth: 1 2         Objective Measures                           Manuals             STM w/ RLE elevated + active ankle pumps             KT tape for swelling management                                         Neuro Re-Ed                                                                                                               Ther Ex  10' 40'         CHAYITO 2 x 10 w/5 hold  R Foot on stool 2 x 10 w/5" hold      Hamstring stretch   R 10" x 5          Gastroc  R 10" x 5       Quad stretch    R 10" x 5          Hip flexor stretch            Quad set   R 5" x 10 w/ bolster at ankle         Heel slides (active)   1 x 10 SAQ  R 5" x 10      LAQ  Pt reports pulling sensation in anterior aspect of knee; transitioned to repeated extension in sitting      SLR  1 x 10      S/L hip abd  R 2 x 5      Clamshell  2 x 12 YTB      Bridge   2 x 10         Standing hip extension   1 x 10 R/L alternating         EDUCATION: Pathology, review of impairements, prognosis, activity modification, POC, and HEP  KE; q-set demo/ trial  HEP updated and reviewed; TB provided         Ther Activity                                         Gait Training                                         Modalities              Ice                           HEP:   Access Code: [email or web address]  URL: [email or web address]  Date: 12/04/2023  Prepared by: Young Engel    Exercises  - Supine Quad Set  - 2 x daily - 1 sets - 10 reps - 5 sec hold  - Supine Single Leg Ankle Pumps  - 2 x daily - 2 sets - 20 reps  - Supine Bridge  - 1 x daily - 1 sets - 10 reps  - Hooklying Clamshell with Resistance  - 1 x daily - 2 sets - 10 reps  - Seated Quad Set  - 1 x daily - 1 sets - 10 reps - 5 hold hold

## 2023-12-05 ENCOUNTER — HOSPITAL ENCOUNTER (OUTPATIENT)
Dept: NON INVASIVE DIAGNOSTICS | Facility: HOSPITAL | Age: 53
Discharge: HOME | End: 2023-12-05
Attending: INTERNAL MEDICINE
Payer: COMMERCIAL

## 2023-12-05 VITALS
HEIGHT: 65 IN | BODY MASS INDEX: 31.65 KG/M2 | HEART RATE: 86 BPM | WEIGHT: 190 LBS | SYSTOLIC BLOOD PRESSURE: 140 MMHG | DIASTOLIC BLOOD PRESSURE: 90 MMHG

## 2023-12-05 DIAGNOSIS — R06.09 DOE (DYSPNEA ON EXERTION): ICD-10-CM

## 2023-12-05 DIAGNOSIS — R07.2 PRECORDIAL PAIN: ICD-10-CM

## 2023-12-05 DIAGNOSIS — I10 HYPERTENSION, UNSPECIFIED TYPE: ICD-10-CM

## 2023-12-05 LAB
AORTIC ROOT: 3 CM
APICAL FOUR CHAMBER EJECTION FRACTION: 58 %
ASCENDING AORTA: 3.7 CM
AV LVOT PEAK GRADIENT: 5 MMHG
AV PEAK GRADIENT: 9 MMHG
DOP CALC LVOT AREA: 3.14 CM2
DOP CALC LVOT DIAMETER: 2 CM
E WAVE DECELERATION TIME: 195 MS
E/A RATIO: 0.75
FRACTIONAL SHORTENING: 38 (ref 28–44)
INTERVENTRICULAR SEPTUM IN DIASTOLE (PARASTERNAL SHORT AXIS VIEW): 0.9 CM
INTERVENTRICULAR SEPTUM: 0.9 CM (ref 0.6–1.1)
LAAS-AP2: 20.2 CM2
LAAS-AP4: 18.7 CM2
LEFT ATRIUM SIZE: 3.6 CM
LEFT ATRIUM VOLUME (MOD BIPLANE): 57 ML
LEFT ATRIUM VOLUME INDEX (MOD BIPLANE): 29.4 ML/M2
LEFT INTERNAL DIMENSION IN SYSTOLE: 3.1 CM (ref 2.1–4)
LEFT VENTRICULAR INTERNAL DIMENSION IN DIASTOLE: 5 CM (ref 3.5–6)
LEFT VENTRICULAR POSTERIOR WALL IN END DIASTOLE: 0.8 CM
LEFT VENTRICULAR STROKE VOLUME: 83 ML
LVSV (TEICH): 83 ML
MAX HR PERCENT: 100 %
MAX HR PERCENT: 92 %
MAX HR: 155 BPM
MAX HR: 155 BPM
MV E'TISSUE VEL-LAT: 10 CM/S
MV E'TISSUE VEL-SEP: 12 CM/S
MV PEAK A VEL: 0.75 M/S
MV PEAK E VEL: 56 CM/S
MV STENOSIS PRESSURE HALF TIME: 56 MS
MV VALVE AREA P 1/2 METHOD: 3.93
RA PRESSURE ESTIMATED: 8 MMHG
RATE PRESSURE PRODUCT: NORMAL
RIGHT ATRIUM AREA SYSTOLE A4C: 15.4 CM2
RIGHT VENTRICLE ID DIMENSION: 3.4 CM
RV PSP: 30 MMHG
SINOTUBULAR JUNCTION: 2.7 CM
SL CV LEFT ATRIUM LENGTH A2C: 5.1 CM
SL CV LV EF: 55
SL CV PED ECHO LEFT VENTRICLE DIASTOLIC VOLUME (MOD BIPLANE) 2D: 120 ML
SL CV PED ECHO LEFT VENTRICLE SYSTOLIC VOLUME (MOD BIPLANE) 2D: 37 ML
SL CV SINUS OF VALSALVA 2D: 2.9 CM
SL CV STRESS RECOVERY BP: NORMAL MMHG
SL CV STRESS RECOVERY HR: 93 BPM
SL CV STRESS RECOVERY O2 SAT: 98 %
SL CV STRESS STAGE REACHED: 3
STJ: 2.7 CM
STRESS ANGINA INDEX: 0
STRESS BASELINE BP: NORMAL MMHG
STRESS BASELINE BP: NORMAL MMHG
STRESS BASELINE HR: 85 BPM
STRESS BASELINE HR: 89 BPM
STRESS O2 SAT REST: 97 %
STRESS PEAK HR: 155 BPM
STRESS POST ESTIMATED WORKLOAD: 10.1 METS
STRESS POST ESTIMATED WORKLOAD: 10.1 METS
STRESS POST EXERCISE DUR MIN: 7 MIN
STRESS POST EXERCISE DUR SEC: 15 SEC
STRESS POST O2 SAT PEAK: 100 %
STRESS POST PEAK BP: 200 MMHG
STRESS POST PEAK HR: 155 BPM
STRESS POST PEAK SYSTOLIC BP: 200 MMHG
TR MAX PG: 22 MMHG
TR PEAK VELOCITY: 2.3 M/S
TRICUSPID ANNULAR PLANE SYSTOLIC EXCURSION: 2.1 CM
TRICUSPID VALVE PEAK REGURGITATION VELOCITY: 2.33 M/S

## 2023-12-05 PROCEDURE — 93018 CV STRESS TEST I&R ONLY: CPT | Performed by: INTERNAL MEDICINE

## 2023-12-05 PROCEDURE — 93306 TTE W/DOPPLER COMPLETE: CPT | Performed by: INTERNAL MEDICINE

## 2023-12-05 PROCEDURE — 93017 CV STRESS TEST TRACING ONLY: CPT

## 2023-12-05 PROCEDURE — 93016 CV STRESS TEST SUPVJ ONLY: CPT | Performed by: INTERNAL MEDICINE

## 2023-12-05 PROCEDURE — 93306 TTE W/DOPPLER COMPLETE: CPT

## 2023-12-07 ENCOUNTER — APPOINTMENT (OUTPATIENT)
Dept: PHYSICAL THERAPY | Facility: CLINIC | Age: 53
End: 2023-12-07
Payer: COMMERCIAL

## 2023-12-11 ENCOUNTER — OFFICE VISIT (OUTPATIENT)
Dept: PHYSICAL THERAPY | Facility: CLINIC | Age: 53
End: 2023-12-11
Payer: COMMERCIAL

## 2023-12-11 DIAGNOSIS — M54.16 RADICULOPATHY, LUMBAR REGION: ICD-10-CM

## 2023-12-11 DIAGNOSIS — M25.561 RIGHT KNEE PAIN, UNSPECIFIED CHRONICITY: Primary | ICD-10-CM

## 2023-12-11 PROCEDURE — 97110 THERAPEUTIC EXERCISES: CPT

## 2023-12-11 NOTE — PROGRESS NOTES
Daily Note     Today's date: 2023  Patient name: Cynthia Brown  : 1970  MRN: [id number]  Referring provider: Mague Myrick DO  Dx:   Encounter Diagnosis     ICD-10-CM    1. Right knee pain, unspecified chronicity  M25.561       2. Radiculopathy, lumbar region  M54.16                      Subjective: Pt states that  morning she woke up and her knee was not bothering her; she was able to kneel onto the floor without pain. Pt states that her cardio stress test was fine. Pt states that she had a little bit of numbness and tingling into her R foot over the weekend which resolved after completion of her HEP and specifically with repeated knee extension in sitting exercise. Objective: See treatment diary below      Assessment: Pt presents with decreased R knee swelling compared to IE. Pt introduced to recumbent bike. Progressed clamshell resistance today. Pt w/ no report of pain with LAQ despite limited tolerance to this movement last session. Pt instructed to continue implementation of CHAYITO (knee) for numbness/tingling relief. Tolerated treatment well. Patient would benefit from continued PT. Plan: Continue per plan of care. Progress treatment as tolerated.        Precautions:   Past Medical History:   Diagnosis Date    Asthma had a history as a a kid/can we chat    Hard of hearing     HL (hearing loss)     Hypertension     Tympanic membrane perforation      Past Surgical History:   Procedure Laterality Date    INCONTINENCE SURGERY      bladder mesh sling    TUBAL LIGATION       SOC: 2023  FOTO: 2023  POC Expiration: 2023  Daily Treatment Log  Date: Initial Evaluation  2023  Next session     Visit#/ auth: 1 2  3       Objective Measures             Edema Tibial plateau: R 24.9DC, L 35cm   Superior patella border: R 43cm, L 40.5cm   Tibial plateau: R 68.82CM   Superior patella border: R 42.75cm               Manuals             STM w/ RLE elevated + active ankle pumps             KT tape for swelling management                                         Neuro Re-Ed                                                                                                               Ther Ex  10' 40'  45'       Recumbent bike   5' 0 resistance     CHAYITO 2 x 10 w/5 hold  R Foot on stool 2 x 10 w/5" hold      Hamstring stretch   R 10" x 5  R 10" x 5        Gastroc  R 10" x 5  R 10" x 5      Quad stretch    R 10" x 5  R 10" x 5        Hip flexor stretch    R 10" x 5        Quad set   R 5" x 10 w/ bolster at ankle R 5" x 10 w/ bolster at ankle       Heel slides (active)   1 x 10 1 x 10  1 x 10 w/ 1.5lb weight on towel       SAQ  R 5" x 10 R 5" x 10     LAQ  Pt reports pulling sensation in anterior aspect of knee; transitioned to repeated extension in sitting R 2 x 5     TKE   R 1 x 10      SLR  1 x 10 1 x 10     S/L hip abd  R 2 x 5 R 3 x 5      Clamshell  2 x 12 YTB R 2 x 10 RTB     Bridge   2 x 10 2 x 10        Standing hip extension   1 x 10 R/L alternating  1 x 10 R/L alternating w/ YTB at prox knee       Monster walk   6' R/L x 4 w/out rest YTB at prox knee      EDUCATION: Pathology, review of impairements, prognosis, activity modification, POC, and HEP  KE; q-set demo/ trial  HEP updated and reviewed; TB provided  HEP updated and reviewed       Ther Activity                                         Gait Training                                         Modalities              Ice                           HEP:     TB provided 12/4/2023    Access Code: [email or web address]  URL: [email or web address]  Date: 12/11/2023  Prepared by: Stephanie Diamond    Exercises  - Supine Single Leg Ankle Pumps  - 2 x daily - 2 sets - 20 reps  - Supine Bridge  - 1 x daily - 2 sets - 10 reps  - Seated Quad Set  - 1 x daily - 1 sets - 10 reps - 5 hold hold  - Standing Quad Set  - 1 x daily - 1 sets - 10 reps  - Clamshell with Resistance  - 1 x daily - 2 sets - 5 reps  - Sidelying Hip Abduction  - 1 x daily - 2 sets - 5 reps

## 2023-12-14 ENCOUNTER — OFFICE VISIT (OUTPATIENT)
Dept: PHYSICAL THERAPY | Facility: CLINIC | Age: 53
End: 2023-12-14
Payer: COMMERCIAL

## 2023-12-14 DIAGNOSIS — M25.561 RIGHT KNEE PAIN, UNSPECIFIED CHRONICITY: Primary | ICD-10-CM

## 2023-12-14 DIAGNOSIS — M54.16 RADICULOPATHY, LUMBAR REGION: ICD-10-CM

## 2023-12-14 PROCEDURE — 97110 THERAPEUTIC EXERCISES: CPT

## 2023-12-14 NOTE — PROGRESS NOTES
Daily Note     Today's date: 2023  Patient name: Jacey Oconnor  : 1970  MRN: [id number]  Referring provider: Yaquelin Saeed DO  Dx:   Encounter Diagnosis     ICD-10-CM    1. Right knee pain, unspecified chronicity  M25.561       2. Radiculopathy, lumbar region  M54.16                      Subjective: Woke up today with an increase in pain. Denies numbness and tingling. States that she feels it is just a bit more swollen than last session. Pt states that on Wednesday she did more driving than in a typical day. States that she if feeling okay right now; took Advil this morning prior to work. Objective: See treatment diary below      Assessment: Pt required VC for form with supine bridges. Pt introduced to 4" heel tap WB on RLE which caused minimal increase in pain. Pt instructed to monitor symptoms with exercise progression today and report at next visit for appropriate progression. Tolerated treatment well. Patient would benefit from continued PT. Plan: Continue per plan of care. Progress treatment as tolerated.        Precautions:   Past Medical History:   Diagnosis Date    Asthma had a history as a a kid/can we chat    Hard of hearing     HL (hearing loss)     Hypertension     Tympanic membrane perforation      Past Surgical History:   Procedure Laterality Date    INCONTINENCE SURGERY      bladder mesh sling    TUBAL LIGATION       SOC: 2023  FOTO: 2023  POC Expiration: 2023  Daily Treatment Log  Date: Initial Evaluation  2023     Visit#/ auth: 1 2  3  4     Objective Measures             Edema Tibial plateau: R 70.4LV, L 35cm   Superior patella border: R 43cm, L 40.5cm   Tibial plateau: R 17.29RG   Superior patella border: R 42.75cm               Manuals             STM w/ RLE elevated + active ankle pumps             KT tape for swelling management                                         Neuro Re-Ed Ther Ex  10' 40'  45'  40'      Recumbent bike   5' 0 resistance 5' 0 resistance    CHAYITO 2 x 10 w/5 hold  R Foot on stool 2 x 10 w/5" hold      Hamstring stretch   R 10" x 5  R 10" x 5   R 20" x 2     Gastroc  R 10" x 5  R 10" x 5  R 20" x 2 w/ sos w/ heel prop    Quad stretch    R 10" x 5  R 10" x 5   20" x 2 w/ sos     Hip flexor stretch    R 10" x 5  R 20" x 2     Quad set   R 5" x 10 w/ bolster at ankle R 5" x 10 w/ bolster at ankle R 5" x 10 w/ bolster at ankle     Heel slides (active)   1 x 10 1 x 10  1 x 10 w/ 1.5lb weight on towel  1 x 10     SAQ  R 5" x 10 R 5" x 10  R 5" x 10    LAQ  Pt reports pulling sensation in anterior aspect of knee; transitioned to repeated extension in sitting R 2 x 5 R 1 x 8    TKE   R 1 x 10  1 x 10 YTB prox knee    SLR  1 x 10 1 x 10 R 1 x 10    S/L hip abd  R 2 x 5 R 3 x 5  R 2 x 8     Clamshell  2 x 12 YTB R 2 x 10 RTB R 2 x 12 RTB    Bridge   2 x 10 2 x 10  2 x 10 RTB @ knee     Standing hip extension   1 x 10 R/L alternating  1 x 10 R/L alternating w/ YTB at prox knee 2 x 5 R/L w/ YTB at prox knee     Monster walk   6' R/L x 4 w/out rest YTB at prox knee  6' x 4 w/out rest YTB at prox knee     EDUCATION: Pathology, review of impairements, prognosis, activity modification, POC, and HEP  KE; q-set demo/ trial  HEP updated and reviewed; TB provided HEP updated and reviewed       Ther Activity        5'     Heel tap       4" step WB on R w/ BUE support   1 x 8                   Gait Training                                         Modalities              Ice                           HEP:     Red TB provided 12/4/2023    Access Code: [email or web address]  URL: [email or web address]  Date: 12/11/2023  Prepared by: Stephanie Diamond    Exercises  - Supine Single Leg Ankle Pumps  - 2 x daily - 2 sets - 20 reps  - Supine Bridge  - 1 x daily - 2 sets - 10 reps  - Seated Quad Set  - 1 x daily - 1 sets - 10 reps - 5 hold hold  - Standing Quad Set  - 1 x daily - 1 sets - 10 reps  - Clamshell with Resistance  - 1 x daily - 2 sets - 5 reps  - Sidelying Hip Abduction  - 1 x daily - 2 sets - 5 reps

## 2023-12-18 ENCOUNTER — OFFICE VISIT (OUTPATIENT)
Dept: PHYSICAL THERAPY | Facility: CLINIC | Age: 53
End: 2023-12-18
Payer: COMMERCIAL

## 2023-12-18 ENCOUNTER — TELEPHONE (OUTPATIENT)
Dept: PHYSICAL THERAPY | Facility: CLINIC | Age: 53
End: 2023-12-18

## 2023-12-18 DIAGNOSIS — M54.16 RADICULOPATHY, LUMBAR REGION: ICD-10-CM

## 2023-12-18 DIAGNOSIS — M25.561 RIGHT KNEE PAIN, UNSPECIFIED CHRONICITY: Primary | ICD-10-CM

## 2023-12-18 PROCEDURE — 97110 THERAPEUTIC EXERCISES: CPT

## 2023-12-18 NOTE — PROGRESS NOTES
Daily Note     Today's date: 2023  Patient name: Honey Aguiar  : 1970  MRN: [id number]  Referring provider: Kelvin Phelan DO  Dx:   Encounter Diagnosis     ICD-10-CM    1. Right knee pain, unspecified chronicity  M25.561       2. Radiculopathy, lumbar region  M54.16                      Subjective: Pt states that her knee was bothering her a bit while driving in her smaller car. Noticed that when she drives a larger car her knee is not as bothersome. Pt has not experienced numbness or tingling into the lower leg recently.       Objective: See treatment diary below      Assessment: Pt with report of RLE pain with hamstring focused bridge; transitioned to open chain hamstring strengthening which pt tolerated well. Pt introduced to RDL for active hamstring stretch; pt required mod VC and visual demonstrate, however was able to demo appropriate form after multiple trials. Tolerated treatment well. Patient would benefit from continued PT.      Plan: Continue per plan of care.  Progress treatment as tolerated.       Precautions:   Past Medical History:   Diagnosis Date    Asthma had a history as a a kid/can we chat    Hard of hearing     HL (hearing loss)     Hypertension     Tympanic membrane perforation      Past Surgical History:   Procedure Laterality Date    INCONTINENCE SURGERY      bladder mesh sling    TUBAL LIGATION       SOC: 2023  FOTO: 2023  POC Expiration: 2023  Daily Treatment Log  Date: Initial Evaluation  2023  FOTO   Visit#/ auth: 1 2  3  4  5   Objective Measures             Edema Tibial plateau: R 38.5cm, L 35cm   Superior patella border: R 43cm, L 40.5cm   Tibial plateau: R 37.75cm   Superior patella border: R 42.75cm               Manuals             STM w/ RLE elevated + active ankle pumps             KT tape for swelling management                                         Neuro Re-Ed                                   "                                                                             Ther Ex  10' 40'  45'  40'   40'   Recumbent bike   5' 0 resistance 5' 0 resistance 5' 0 resistance   CHAYITO 2 x 10 w/5 hold  R Foot on stool 2 x 10 w/5\" hold      Hamstring stretch   R 10\" x 5  R 10\" x 5   R 20\" x 2    Gastroc  R 10\" x 5  R 10\" x 5  R 20\" x 2 w/ sos w/ heel prop    Quad stretch    R 10\" x 5  R 10\" x 5   20\" x 2 w/ sos     Hip flexor stretch    R 10\" x 5  R 20\" x 2     Quad set   R 5\" x 10 w/ bolster at ankle R 5\" x 10 w/ bolster at ankle R 5\" x 10 w/ bolster at ankle     Heel slides (active)   1 x 10 1 x 10  1 x 10 w/ 1.5lb weight on towel  1 x 10 1 x 15 physio ball   SAQ  R 5\" x 10 R 5\" x 10  R 5\" x 10    LAQ  Pt reports pulling sensation in anterior aspect of knee; transitioned to repeated extension in sitting R 2 x 5 R 1 x 8 1 x 10   TKE   R 1 x 10  1 x 10 YTB prox knee R 1 x 10 vs yellow ball   SLR  1 x 10 1 x 10 R 1 x 10 R 2 x 10   S/L hip abd  R 2 x 5 R 3 x 5  R 2 x 8  R 2 x 8    Clamshell  2 x 12 YTB R 2 x 10 RTB R 2 x 12 RTB R 1 x 12 RTB  R 1 x 12 GTB   Bridge   2 x 10 2 x 10  2 x 10 RTB @ knee 2 x 10 RTB @ knee    Pt unable to tolerate ham bridge on physioball or table.   Standing hip extension   1 x 10 R/L alternating  1 x 10 R/L alternating w/ YTB at prox knee 2 x 5 R/L w/ YTB at prox knee     Standing knee flexion     1 x 10 R w/ 1.5# ankle weight   Monster walk   6' R/L x 4 w/out rest YTB at prox knee  6' x 4 w/out rest YTB at prox knee     RDL     1 x 10 B/L w/ cane for form   EDUCATION: Pathology, review of impairements, prognosis, activity modification, POC, and HEP  KE; q-set demo/ trial  HEP updated and reviewed; TB provided HEP updated and reviewed       Ther Activity        5'  5'   Heel tap       4\" step WB on R w/ BUE support   1 x 8  4\" step WB on R w/ BUE support   1 x 8    Forward step up           4\" step R WB on step  1 x 8   Gait Training                                         Modalities          "     Ice                           HEP:     Red TB provided 12/4/2023    Access Code: [email or web address]  URL: [email or web address]  Date: 12/11/2023  Prepared by: Natividad Campbell    Exercises  - Supine Single Leg Ankle Pumps  - 2 x daily - 2 sets - 20 reps  - Supine Bridge  - 1 x daily - 2 sets - 10 reps  - Seated Quad Set  - 1 x daily - 1 sets - 10 reps - 5 hold hold  - Standing Quad Set  - 1 x daily - 1 sets - 10 reps  - Clamshell with Resistance  - 1 x daily - 2 sets - 5 reps  - Sidelying Hip Abduction  - 1 x daily - 2 sets - 5 reps

## 2023-12-18 NOTE — TELEPHONE ENCOUNTER
"Patient sent internal message regarding 12/20/23 appt stating \"I am sorry I have to work late that day and just got the email \" appt to be removed.     Lauren Yanes PT, DPT   License #  [id number]    "

## 2023-12-20 ENCOUNTER — APPOINTMENT (OUTPATIENT)
Dept: PHYSICAL THERAPY | Facility: CLINIC | Age: 53
End: 2023-12-20
Payer: COMMERCIAL

## 2023-12-26 ENCOUNTER — OFFICE VISIT (OUTPATIENT)
Dept: PHYSICAL THERAPY | Facility: CLINIC | Age: 53
End: 2023-12-26
Payer: COMMERCIAL

## 2023-12-26 DIAGNOSIS — M25.561 RIGHT KNEE PAIN, UNSPECIFIED CHRONICITY: Primary | ICD-10-CM

## 2023-12-26 DIAGNOSIS — M54.16 RADICULOPATHY, LUMBAR REGION: ICD-10-CM

## 2023-12-26 PROCEDURE — 97110 THERAPEUTIC EXERCISES: CPT

## 2023-12-26 NOTE — PROGRESS NOTES
"Daily Note     Today's date: 2023  Patient name: Honey Aguiar  : 1970  MRN: [id number]   Referring provider: Kelvin Phelan DO  Dx:   Encounter Diagnosis     ICD-10-CM    1. Right knee pain, unspecified chronicity  M25.561       2. Radiculopathy, lumbar region  M54.16                      Subjective: pt reports to session with no complaints of pain today.     Objective: See treatment diary below      Assessment: Tolerated treatment well. Pt had no noted increases in R knee pain during or post session. Progressions into SL stance strengthening and proprioception. Cont to progress as tolerated per symptom irritability. Patient exhibited good technique with therapeutic exercises      Plan: Continue per plan of care.      Precautions:   Past Medical History:   Diagnosis Date    Asthma had a history as a a kid/can we chat    Hard of hearing     HL (hearing loss)     Hypertension     Tympanic membrane perforation      Past Surgical History:   Procedure Laterality Date    INCONTINENCE SURGERY      bladder mesh sling    TUBAL LIGATION       SOC: 2023  FOTO: 2023  POC Expiration: 2023  Daily Treatment Log  Date: 2023  FOTO   Visit#/ auth: 6     5   Objective Measures          Edema                 Manuals             STM w/ RLE elevated + active ankle pumps             KT tape for swelling management                                         Neuro Re-Ed                                                                                                            Ther Ex      40'   Recumbent bike 5'     5' 0 resistance   CHAYITO 2 x 10 w/5 hold        Hamstring stretch  20\"x2 w supine w/ sos        Gastroc Slant board 20\"x2 B/L        Quad stretch Prone w/ sos 20\"x2 R         Hip flexor stretch  foot on step 20\"x2 R/L         Quad set          Heel slides (active)      1 x 15 physio ball   SAQ        LAQ W/ add 3\" 2x10 R     1 x 10   STS low mat  2x10        TKE     R 1 x 10 vs " "yellow ball   SLR flex 2x10 R     R 2 x 10   S/L hip abd 2x10 R     R 2 x 8    Clamshell GTB 2x10 R/L     R 1 x 12 RTB  R 1 x 12 GTB   Bridge  w/ GTB abd 5\" 2x10     2 x 10 RTB @ knee    Pt unable to tolerate ham bridge on physioball or table.   Standing hip extension  YTB @ knee 2x10 R/L alternating         Standing knee flexion     1 x 10 R w/ 1.5# ankle weight   Monster walk YTB @ knee 10'x3        Side stepping  YTB @ knee 10'x3       RDL     1 x 10 B/L w/ cane for form   SLS w/ fwd reach  2x10 R/L        EDUCATION: Pathology, review of impairements, prognosis, activity modification, POC, and HEP         Ther Activity        5'   Heel tap        4\" step WB on R w/ BUE support   1 x 8    Forward step up   6\" 2x10 R          4\" step R WB on step  1 x 8   Gait Training                                         Modalities              Ice                           HEP:     Red TB provided 12/4/2023    Access Code: [email or web address]  URL: [email or web address]  Date: 12/11/2023  Prepared by: Natividad Campbell    Exercises  - Supine Single Leg Ankle Pumps  - 2 x daily - 2 sets - 20 reps  - Supine Bridge  - 1 x daily - 2 sets - 10 reps  - Seated Quad Set  - 1 x daily - 1 sets - 10 reps - 5 hold hold  - Standing Quad Set  - 1 x daily - 1 sets - 10 reps  - Clamshell with Resistance  - 1 x daily - 2 sets - 5 reps  - Sidelying Hip Abduction  - 1 x daily - 2 sets - 5 reps                 "

## 2023-12-28 ENCOUNTER — OFFICE VISIT (OUTPATIENT)
Dept: PHYSICAL THERAPY | Facility: CLINIC | Age: 53
End: 2023-12-28
Payer: COMMERCIAL

## 2023-12-28 DIAGNOSIS — M54.16 RADICULOPATHY, LUMBAR REGION: ICD-10-CM

## 2023-12-28 DIAGNOSIS — M25.561 RIGHT KNEE PAIN, UNSPECIFIED CHRONICITY: Primary | ICD-10-CM

## 2023-12-28 PROCEDURE — 97110 THERAPEUTIC EXERCISES: CPT

## 2023-12-28 NOTE — PROGRESS NOTES
"Daily Note     Today's date: 2023  Patient name: Honey Aguiar  : 1970  MRN: [id number]  Referring provider: Kelvin Phelan DO  Dx:   Encounter Diagnosis     ICD-10-CM    1. Right knee pain, unspecified chronicity  M25.561       2. Radiculopathy, lumbar region  M54.16                      Subjective: Pt reports 0/10 R knee pain at start of session. States that as of 2 weeks ago she has had an increase in low back pain; attributes it to increased activity. States that back pain has always come and gone. Denies radicular symptoms into the LE. Low back pain increases with STS transfer while getting up from couch. Rates pain at 5/10. States that standing is bothersome as well; has to sit down after 10 minutes.       Objective: See treatment diary below    Mechanical Assessment:   Pre-test symptoms = 5/10 Low back pain across B/L PSIS   REIL 1 x 10 w/ 5-10\" hold = 1/10 Low back pain across B/L PSIS    Assessment: Pt with improved back pain following REIL; pt attributes to stretch. Pt instructed to inform PT of increased back and knee pain throughout session for appropriate exercise modification; pt agreeable. Informed pt on importance of abdominal engagement to decrease stress on lumbar spine; provided cues for TA activation t/o session. Updated HEP. Tolerated treatment well. Patient would benefit from continued PT.      Plan: Add REIL to HEP next session. Continue per plan of care.  Progress treatment as tolerated.       Precautions:   Past Medical History:   Diagnosis Date    Asthma had a history as a a kid/can we chat    Hard of hearing     HL (hearing loss)     Hypertension     Tympanic membrane perforation      Past Surgical History:   Procedure Laterality Date    INCONTINENCE SURGERY      bladder mesh sling    TUBAL LIGATION       SOC: 2023  FOTO: 2023  POC Expiration: 2023  Daily Treatment Log  Date: 2023  FOTO   Visit#/ auth: 6 7    5   Objective " "Measures          Edema         Mechanical Assessment  KE: see objective      Manuals             STM w/ RLE elevated + active ankle pumps             KT tape for swelling management                                         Neuro Re-Ed                                                                                                            Ther Ex  40'    40'   Recumbent bike 5'  5'    5' 0 resistance   CHAYITO 2 x 10 w/5 hold        REIL  1 x 10       Hamstring stretch  20\"x2 w supine w/ sos        Gastroc Slant board 20\"x2 B/L        Quad stretch Prone w/ sos 20\"x2 R         Hip flexor stretch  foot on step 20\"x2 R/L         Heel slides (active)   1 x 10 physio ball   1 x 15 physio ball   LAQ W/ add 3\" 2x10 R  W/ add 3\" 1x10 R     W/abd 3\" 1 x 10 R   1 x 10   STS low mat  2x10  1 x 10 High mat d/t report of low back pain today      TKE  R 5\" x 10 vs yellow ball   R 1 x 10 vs yellow ball   SLR flex 2x10 R     R 2 x 10   S/L hip abd 2x10 R  1 x 10 R   R 2 x 8    Clamshell GTB 2x10 R/L  GTB 2x10 R/L    R 1 x 12 RTB  R 1 x 12 GTB   Reverse clamshell  1 x 10 R      Bridge  w/ GTB abd 5\" 2x10  TA set + bridge 1 x 10   2 x 10 RTB @ knee    Pt unable to tolerate ham bridge on physioball or table.   Standing hip extension  YTB @ knee 2x10 R/L alternating         Standing knee flexion     1 x 10 R w/ 1.5# ankle weight   Monster walk YTB @ knee 10'x3        Side stepping  YTB @ knee 10'x3       RDL  1 x 10 B/L w/ cane for form + GTB abd; VC for abdominal set   1 x 10 B/L w/ cane for form   Mini squat        SLS w/ fwd reach  2x10 R/L        EDUCATION: Pathology, review of impairements, prognosis, activity modification, POC, and HEP         Ther Activity        5'   Heel tap        4\" step WB on R w/ BUE support   1 x 8    Forward step up   6\" 2x10 R          4\" step R WB on step  1 x 8   Gait Training                                         Modalities              Ice                           HEP:     Red TB provided " 12/4/2023    Access Code: [email or web address]  URL: [email or web address]  Date: 12/28/2023  Prepared by: Natividad Campbell    Exercises  - Supine Bridge  - 1 x daily - 1 sets - 10 reps  - Seated Quad Set  - 1 x daily - 1 sets - 10 reps - 5 hold hold  - Standing Quad Set  - 1 x daily - 1 sets - 10 reps - 5 seconds hold  - Clamshell with Resistance  - 1 x daily - 2 sets - 10 reps  - Sidelying Hip Abduction  - 1 x daily - 1 sets - 10 reps

## 2024-01-02 ENCOUNTER — APPOINTMENT (OUTPATIENT)
Dept: PHYSICAL THERAPY | Facility: CLINIC | Age: 54
End: 2024-01-02
Payer: COMMERCIAL

## 2024-01-02 ENCOUNTER — TELEPHONE (OUTPATIENT)
Dept: PHYSICAL THERAPY | Facility: CLINIC | Age: 54
End: 2024-01-02

## 2024-01-02 NOTE — TELEPHONE ENCOUNTER
Patient lm @ 3:49pm today stating she has a migraine and was going home to lay down.  apologized for the last minute cancel.

## 2024-01-03 ENCOUNTER — OFFICE VISIT (OUTPATIENT)
Dept: PHYSICAL THERAPY | Facility: CLINIC | Age: 54
End: 2024-01-03
Payer: COMMERCIAL

## 2024-01-03 DIAGNOSIS — M54.16 RADICULOPATHY, LUMBAR REGION: ICD-10-CM

## 2024-01-03 DIAGNOSIS — M25.561 RIGHT KNEE PAIN, UNSPECIFIED CHRONICITY: Primary | ICD-10-CM

## 2024-01-03 PROCEDURE — 97110 THERAPEUTIC EXERCISES: CPT

## 2024-01-03 NOTE — PROGRESS NOTES
Daily Note     Today's date: 1/3/2024  Patient name: Honey Aguiar  : 1970  MRN: [id number]  Referring provider: Kelvin Phelan DO  Dx:   Encounter Diagnosis     ICD-10-CM    1. Right knee pain, unspecified chronicity  M25.561       2. Radiculopathy, lumbar region  M54.16                      Subjective: R knee has been feeling good. No pain on arrival to PT today. States that PT has been helping her; is not sure if she is ready to discharge to [facility], however.       Objective: See treatment diary below      Assessment: Required intermittent UE/ contralateral LE touch down support with SLS and contralateral LE hip flex, abd, and ext. Pt denies pain with LE strengthening progressions. Pt continues to require VC for form correction w/ RDLs; able to demo appropriate form after multiple attempts to correct. Tolerated treatment well. Patient would benefit from continued PT.       Plan: Perform STS with staggered stance next visit. Continue per plan of care.  Progress treatment as tolerated.       Precautions:   Past Medical History:   Diagnosis Date    Asthma had a history as a a kid/can we chat    Hard of hearing     HL (hearing loss)     Hypertension     Tympanic membrane perforation      Past Surgical History:   Procedure Laterality Date    INCONTINENCE SURGERY      bladder mesh sling    TUBAL LIGATION       SOC: 2023  FOTO: 2023  POC Expiration: 2023  Daily Treatment Log  Date: 2023 2023 1/3/2024 Next session  2023  FOTO   Visit#/ auth: 6 7 8   5   Objective Measures          Edema         Mechanical Assessment  KE: see objective      Manuals             STM w/ RLE elevated + active ankle pumps             KT tape for swelling management                                         Neuro Re-Ed                                                                                                            Ther Ex  40' 40'   40'   Recumbent bike 5'  5'  5' L 2  5' 0 resistance   CHAYITO 2 x  "10 w/5 hold        REIL  1 x 10  1 x 10            Hamstring stretch  20\"x2 w supine w/ sos        Gastroc Slant board 20\"x2 B/L        Quad stretch Prone w/ sos 20\"x2 R         Hip flexor stretch  foot on step 20\"x2 R/L         Heel slides (active)   1 x 10 physio ball   1 x 15 physio ball   LAQ W/ add 3\" 2x10 R  W/ add 3\" 1x10 R     W/abd 3\" 1 x 10 R   1 x 10   STS low mat  2x10  1 x 10 High mat d/t report of low back pain today 2 x 10 YTB @ knee Perform w/ staggered stance next session    TKE  R 5\" x 10 vs yellow ball R 5\" x 10 vs yellow ball  R 1 x 10 vs yellow ball   SLR flex 2x10 R   R 2 x 10   R 2 x 10   S/L hip abd 2x10 R  1 x 10 R R 2 x 10  R 2 x 8    Clamshell GTB 2x10 R/L  GTB 2x10 R/L  GTB 2x10 R/L alternating w/ reverse clam below  R 1 x 12 RTB  R 1 x 12 GTB   Reverse clamshell  1 x 10 R 1 x 10 R YTB     Bridge  w/ GTB abd 5\" 2x10  TA set + bridge 1 x 10 B/L feet on bosu 2 x 10  2 x 10 RTB @ knee    Pt unable to tolerate ham bridge on physioball or table.   Standing hip extension  YTB @ knee 2x10 R/L alternating   1 x 10 RTB @ knee altern.      Standing knee flexion     1 x 10 R w/ 1.5# ankle weight   Monster walk YTB @ knee 10'x3        Side stepping  YTB @ knee 10'x3       RDL  1 x 10 B/L w/ cane for form + GTB abd; VC for abdominal set 1 x 10 YTB @knee w/ cane  1 x 10 B/L w/ cane for form   Mini squat        SLS w/ fwd reach  2x10 R/L   1 x 10 R/L w/ [place] on contralateral LE     EDUCATION: Pathology, review of impairements, prognosis, activity modification, POC, and HEP         Ther Activity        5'   Heel tap        4\" step WB on R w/ BUE support   1 x 8    Forward step up   6\" 2x10 R          4\" step R WB on step  1 x 8   Gait Training                                         Modalities              Ice                           HEP:     Red TB provided 12/4/2023    Access Code: [email or web address]  URL: [email or web address]  Date: 12/28/2023  Prepared by: Natividad Rome  - " Supine Bridge  - 1 x daily - 1 sets - 10 reps  - Seated Quad Set  - 1 x daily - 1 sets - 10 reps - 5 hold hold  - Standing Quad Set  - 1 x daily - 1 sets - 10 reps - 5 seconds hold  - Clamshell with Resistance  - 1 x daily - 2 sets - 10 reps  - Sidelying Hip Abduction  - 1 x daily - 1 sets - 10 reps

## 2024-01-10 ENCOUNTER — OFFICE VISIT (OUTPATIENT)
Dept: PHYSICAL THERAPY | Facility: CLINIC | Age: 54
End: 2024-01-10
Payer: COMMERCIAL

## 2024-01-10 DIAGNOSIS — M54.16 RADICULOPATHY, LUMBAR REGION: ICD-10-CM

## 2024-01-10 DIAGNOSIS — M25.561 RIGHT KNEE PAIN, UNSPECIFIED CHRONICITY: Primary | ICD-10-CM

## 2024-01-10 PROCEDURE — 97110 THERAPEUTIC EXERCISES: CPT

## 2024-01-10 NOTE — PROGRESS NOTES
PT Discharge    Today's date: 1/10/2024  Patient name: Honey Aguiar  : 1970  MRN: [id number]  Referring provider: Kelvin Phelan DO  Dx:   Encounter Diagnosis     ICD-10-CM    1. Right knee pain, unspecified chronicity  M25.561       2. Radiculopathy, lumbar region  M54.16                      Assessment  Assessment details: 2023 Honey Aguiar is a 53 y.o. female who presents with R knee pain for 4-5 years w/ new onset of numbness and tingling into R foot in November which has improved w/ course of oral steroid. Pt presents with R tibiofemoral joint swelling and impaired R knee AROM, R knee strength, R patella mobility, and RLE balance. No hx of back pain or pain proximal to R knee, lumbar AROM WNL and no effect on repeated lumbar extension in lying. Pt presents with a positive Des on R and pain w/ Thessaly of RLE. Due to these impairments, patient has difficulty performing sit to stand transfers, floor to stand transfers, and navigating stairs affecting her ability to get out of her chair and clean her home. Patient's clinical presentation is consistent with internal derangement of R knee and their referring diagnosis of Right knee pain, unspecified chronicity  (primary encounter diagnosis). Patient has been educated in pathology, review of impairements, prognosis, activity modification, to contact MD if pt notes coldness of RLE, POC, and HEP. Patient would benefit from skilled physical therapy services to address their aforementioned functional limitations and progress towards prior level of function and independence with home exercise program.     1/10/2024: Honey Aguiar is a 53 y.o. female who presents to PT with report of improved R knee numbness and tingling and pain with activity. Pt demonstrates improved RLE strength, RLE flexibility, R knee AROM, RLE SLS balance, and squat mechanics. Despite improvements pt demonstrates increase in R knee circumference at supra patellar border  "compared to contralateral LE and continues to lack 3 deg knee extension AROM. Patient's clinical presentation is consistent with internal derangement of R knee and their referring diagnosis of Right knee pain, unspecified chronicity  (primary encounter diagnosis). Patient has been educated in progress with PT, remaining deficits, and importance of continued HEP for maintenance following discharge. Pt expresses confidence discharging to [facility] at this time. Due to patient report of return to pain-free ADLs, confidence/ understanding of pain management techniques, and progress demonstrated on objective measures pt's current episode of care is to be discharged at this time. She was informed to call/ return if needed or if questions/ concerns arise.    Goals  Short Term Goals:  Target Date 4 weeks (12/28/2023)  1. Pt will be independent in HEP in order to demonstrate compliance and active participation in recovery. (MET 1/10/2024)  2 Improve supine AROM R knee within 5 degrees contralateral LE in order to prepare for reciprocal stairs. (MET 1/10/2024)  3. Improve R LE strength to greater than or equal to 4+/5 to improve sit to stand transfer. (MET 1/10/2024)  4. Pt will present w/ R girth measurement (tibial plateau and superior border of patella) within 1.5 cm contralateral LE to demonstrate appropriate swelling management techniques/ normalize ROM.  (Partially met 1/10/2024)    Long Term Goals:  Target Date 12 weeks (2/22/2024)  1. Pt to tolerate 1 FOS w/ reciprocal pattern w handrail and less than 2/10 pain in order to get to her bedroom.  (MET 1/10/2024)  2. Pt will be able to complete floor to stand transfer without UE assistance and less than 2/10 pain in order to return to cleaning her floors. (Partially met, 1/10/2024)  3. Improve balance such that pt can maintain R SLS x 30\" to decrease fall risk. (MET 1/10/2024)  4. Pt will demo R SLR w/ 0 deg knee ext to demo improved knee AROM and quad strength necessary for " functional transfers. (Partially met, 1/10/2024)        Plan  Plan details: Pt's current episode of care is to be discharged at this time. Pt confident with discharge to current [facility]. Pt encouraged to call/ return to PT if needed.  Duration in weeks: 12  Plan of Care beginning date: 2023  Plan of Care expiration date: 2024  Treatment plan discussed with: patient      Subjective Evaluation    History of Present Illness  Mechanism of injury: 2023: Pt is a 53 y.o. female who presents to PT w/ complaint of R knee pain. Pt has been experiencing R knee pain for 4-5 years. Reports experiencing pain and swelling that comes and goes which she has managed with rest, Advil, and elevation. Pt states that at the beginning of November she experienced R knee instability and felt as though she was going to fall in the grocery store; experienced numbness and tingling as well which was new and the reason for pt's visit to MD. No hx of knee injury; no hx of back pain. Denies hx of LLE injury as well. Denies back pain and thigh pain. Pain located along anterior tibiofemoral joint. Numbness and tingling resolved since oral steroid medication prescribed to her on the 2023 for 7 day course. Pt is a  for [employer]. She denies pain with sitting; only experiences pain when going to cross her R leg over contralateral LE. Pt denies sensation of coldness in R foot. Pt has not had PT previously.    1/10/2024: Pt reports resolution in numbness and tingling of R foot. Pt feels very little pain in the knee; pain occurs only at night when she has been on it for a while or with a lot of driving. Minimal pain with STS transfer occasionally. Pt reports no issues with cleaning and cooking. States that feels confident discharging to [facility] at this time. Has met her goals of eliminating numbness and returning to ADL without pain/ limitation.   Patient Goals  Patient goal: D/C to HEP  Pain  Current pain ratin  At  best pain ratin  At worst pain ratin (At end of active day)  Quality: throbbing  Exacerbated by: STS and car transfers, squating, floor to stand transfers.    Social Support  Steps to enter house: yes (uni rail)  2  Lives in: multiple-level home (w/ uni rail)  Lives with: adult children      Diagnostic Tests  Abnormal x-ray: 2023 R knee: No acute osseous abnormality..  Treatments  Previous treatment: medication  Current treatment: physical therapy    Objective  POSTURE:  2023: Standing posture demonstrates increased lumbar lordosis; no gross lateral shift.  1/10/2024: Unchanged from initial evaluation.     Balance:   2023 1/10/2024  Tandem R post w/ EO: >30 sec NT  Tandem L post  w/ EO: >30 sec NT  SLS R w/ EO:  23.7 sec >30 sec  SLS L w/ EO:  >30 sec >30 sec      LUMBAR AROM: 2023 1/10/2024  Flexion   nil carlson  nil carlson  Extension  nil carlson  nil carlson  R sideglide  nil carlson  nil carlson  L sideglide  nil carlson  nil carlson      DERMATOMAL TESTIN2023   1/10/2024  L2 anterior thigh:  RLE intact to light touch NT  L3 medial knee:  RLE intact to light touch NT  L4 medial maleolus:  RLE intact to light touch NT  L5 1st web space:  RLE intact to light touch NT  S1 lateral/sole foot:  RLE intact to light touch NT  S2 poster calf:  RLE intact to light touch NT    MYOTOMAL TESTIN2023     Right  Left  Right   L2-3 Hip flexion:  5/  5/5  5/5  L3-4 Knee extension:  /  5/5  5/5  L5 Great toe exten:  /5  /5  5/5  L4 Heel walk/DF:  5/  5/5  5/5  S1 toe walk/PF/ever:  5/  5/5  5/5  S2 knee flex:   /  5/5  5/5    MMT:    R  L  R     2023 2023 1/10/2023  Knee flex  /  5/  5/5  Knee exten  /  5/5  5/5  Hip flexion  /  5/  5/5  Hip ER   NT  NT  >3/5  Hip IR   NT  NT  >3/5  Hip exten  4/5  4/5  5/5    REFLEXES:     2023 1/10/2024  L3-4 Quadriceps:  2+ bilaterally NT  L5-S1 Achilles:  2+bilaterally NT      MECHANICAL ASSESSMENT:   2023  Pre-test  findings include: R anterior knee pain, 5/10  Repeated extension in lying (REIL) 1x10 = NE  1/10/2024:   NT    AROM:   R  L   R      11/30/2023 11/30/2023  1/10/2024  Knee flex sup/prone  135 /120 135/120  136/ 120  Knee ext Qset/SLR  -10/-12  0/ 0   -3/ -4    Girth/Circumference:  11/30/2023:    Tibial plateau: R 38.5cm, L 35cm   Superior patella border: R 43cm, L 40.5cm  1/10/2023   Tibial plateau: R 36.5cm, L NT   Superior patella border: R 44 cm, L 42 cm     FLEXIBILITY:  11/30/2023  Hamstring flexibility 80 L, 80 R          Quad flexibility mi- mod restriction bilaterally     1/10/2024  Hamstring flexibility 85 R, L NT          Quad flexibility, R nil, L NT    Palpation:  11/30/2023: Pt denies TTP along anterior/medial/lateral/posterior knee. Knee w/ mild increase in warmth compared to contralateral LE; swelling of R tibiofemoral jt noted- refer to girth measurement above. No redness noted.   1/10/2024: NT    Patella mobility:  11/30/2023: R superior glide WNL, medial/lateral/ inferior w/ mod hypomobility. L patella mobility WNL  1/10/2024: NT    SPECIAL TESTS     11/30/2023       1/10/2024   SLR supine:   (- R/- L)       NT  Crossed SLR:   (- R/- L)       NT  Des's   (+ R/- L) - pt reports mild n/t into foot following test  NT  Thessaly  (+ R for pain/ no clicking noted by pt/- L)   NT  Lachman (anterior) (- R/- L)       NT  Lachman (posterior) (- R/- L)       NT  Anterior drawer (- R/- L)       NT  Posterior drawer (- R/- L)       NT  Valgus stress test (- R/- L)       NT  Varus stress test (- R/- L)       NT      FUNCTION:  11/30/2023  Stairs are step-to w/ use of rail; most painful descending reciprocal   Squat: NT  1/10/2023:   Stairs are reciprocal; does not need rail. Pain-free.   Squat: Good form, increased hip ER t/o   Floor to stand transfer: Pt reports 0/10 pain performing   STS transfer: Pt reports 0/10 pain performing     Precautions:   Past Medical History:   Diagnosis Date    Asthma had a  "history as a a kid/can we chat    Hard of hearing     HL (hearing loss)     Hypertension     Tympanic membrane perforation      Past Surgical History:   Procedure Laterality Date    INCONTINENCE SURGERY      bladder mesh sling    TUBAL LIGATION       SOC: 11/30/2023  FOTO: 1/10/2024  POC Expiration: 2/22/2023  Daily Treatment Log  Date: 12/26/2023 12/28/2023 1/3/2024 1/10/2024    Visit#/ auth: 6 7 8 9  RE/ FOTO    Objective Measures         Edema        Mechanical Assessment  KE: see objective      Manuals           STM w/ RLE elevated + active ankle pumps           KT tape for swelling management                                   Neuro Re-Ed                                                                                             Ther Ex  40' 40' 35'    Recumbent bike 5'  5'  5' L 2 5' L 2    CHAYITO 2 x 10 w/5 hold        REIL  1 x 10  1 x 10            Hamstring stretch  20\"x2 w supine w/ sos        Gastroc Slant board 20\"x2 B/L        Quad stretch Prone w/ sos 20\"x2 R        Hip flexor stretch  foot on step 20\"x2 R/L        Heel slides (active)   1 x 10 physio ball      LAQ W/ add 3\" 2x10 R  W/ add 3\" 1x10 R     W/abd 3\" 1 x 10 R      STS low mat  2x10  1 x 10 High mat d/t report of low back pain today 2 x 10 YTB @ knee     TKE  R 5\" x 10 vs yellow ball R 5\" x 10 vs yellow ball     SLR flex 2x10 R   R 2 x 10      S/L hip abd 2x10 R  1 x 10 R R 2 x 10     Clamshell GTB 2x10 R/L  GTB 2x10 R/L  GTB 2x10 R/L alternating w/ reverse clam below     Reverse clamshell  1 x 10 R 1 x 10 R YTB     Bridge  w/ GTB abd 5\" 2x10  TA set + bridge 1 x 10 B/L feet on bosu 2 x 10     Standing hip extension  YTB @ knee 2x10 R/L alternating   1 x 10 RTB @ knee altern.     Standing knee flexion        Monster walk YTB @ knee 10'x3        Side stepping  YTB @ knee 10'x3       RDL  1 x 10 B/L w/ cane for form + GTB abd; VC for abdominal set 1 x 10 YTB @knee w/ cane     Mini squat        SLS w/ fwd reach  2x10 R/L   1 x 10 R/L w/ christy " "on contralateral LE     Objective measures: Lumbar AROM, Knee AROM, MMT, balance, and squat    KE    EDUCATION: Pathology, review of impairements, prognosis, activity modification, POC, and HEP    KE    Ther Activity          Heel tap           Forward step up   6\" 2x10 R           Gait Training                                   Modalities            Ice                       HEP:     Red TB provided 12/4/2023    Access Code: [email or web address]  URL: [email or web address]  Date: 12/28/2023  Prepared by: Natividad Campbell    Exercises  - Supine Bridge  - 1 x daily - 1 sets - 10 reps  - Seated Quad Set  - 1 x daily - 1 sets - 10 reps - 5 hold hold  - Standing Quad Set  - 1 x daily - 1 sets - 10 reps - 5 seconds hold  - Clamshell with Resistance  - 1 x daily - 2 sets - 10 reps  - Sidelying Hip Abduction  - 1 x daily - 1 sets - 10 reps       "

## 2024-01-12 ENCOUNTER — APPOINTMENT (OUTPATIENT)
Dept: PHYSICAL THERAPY | Facility: CLINIC | Age: 54
End: 2024-01-12
Payer: COMMERCIAL

## 2024-01-16 ENCOUNTER — APPOINTMENT (OUTPATIENT)
Dept: PHYSICAL THERAPY | Facility: CLINIC | Age: 54
End: 2024-01-16
Payer: COMMERCIAL

## 2024-04-11 ENCOUNTER — APPOINTMENT (OUTPATIENT)
Dept: AUDIOLOGY | Facility: CLINIC | Age: 54
End: 2024-04-11
Payer: COMMERCIAL

## 2024-04-11 DIAGNOSIS — H90.3 SENSORY HEARING LOSS, BILATERAL: Primary | ICD-10-CM

## 2024-04-19 NOTE — PROGRESS NOTES
Hearing Aid Visit:    Name:  Honey Aguiar  :  1970  Age:  54 y.o.  Date of Evaluation: 2024    Honey Aguiar is being seen for a hearing aid visit.  It has been almost a year since her last visit to our center.   She is interested in re-evaluation via DVR for services as her hearing aids are 8 years old.      Patient is fit with [employer] [place] 2 Pro hearing aid(s) fit in 2016  Right serial number [id number]. Left serial number [id number].   Warranty date: OOW 19 (Loss/Damage and repair).   Left aid repair Lalo [employer] Make repair until 2024 (kept serial #)    Action:  1. Cleaned and checked both devices / changed parts   2. Canals are clear     Recommendations:   1. DVR intake was updated and faxed on 24  2. Patient to be in contact for updated HAE if approved by DVR  / she would need updated audiogram as well, her last was in 10/2022     Efra Corea, [place], [place]# [id number]  Clinical Audiologist

## 2024-07-23 ENCOUNTER — OFFICE VISIT (OUTPATIENT)
Dept: AUDIOLOGY | Facility: CLINIC | Age: 54
End: 2024-07-23
Payer: COMMERCIAL

## 2024-07-23 DIAGNOSIS — H90.3 SENSORY HEARING LOSS, BILATERAL: Primary | ICD-10-CM

## 2024-07-23 PROCEDURE — V5261 HEARING AID, DIGIT, BIN, BTE: HCPCS | Performed by: AUDIOLOGIST

## 2024-07-25 NOTE — PROGRESS NOTES
Hearing Aid Fitting    Name:  Honey Aguiar  :  1970  Age:  54 y.o.  MRN:  [id number]  Date of Evaluation: 2024    HISTORY:    Honey Aguiar was seen today for a binaural hearing aid fitting of her Oticon Zircon 2  in the canal (DEIDRE) hearing aid(s). Honey was unaccompanied to today's visit. Hearing aid purchase is being paid by private Pay.       This is the initial visit on the OTC contract     DEVICE INFORMATION:     Left Device Right Device   Hearing Aid Make: Oticon  Oticon    Hearing Aid Model: Zircon 2 mini R Zircon 2 mini R    Serial Number: [id number]   Repair Warranty Date: 2027   Loss/Damage Warranty Status: Active  Active        Length/Output # 2 85 w/lock  # 2 85 w/lock   Wax System: Pro Wax miniFIT Pro Wax miniFIT   Dome Size/Style: 6 mm DB  6 mm DB    Battery: Lithium-ion Rechargeable Lithium-ion Rechargeable       Serial Number:  [id number]    Warranty Date:  2027     Accessories: N/A       DEVICE SETTINGS:    Hearing aid(s) were programmed using NAL 2 fitting formula and Oticon personalization.  They were adjusted based on the patient's perceived comfort level. Hearing aid(s) were set to experience level 3  per patient's subjective listening preference. The patient noted good sound quality, and was happy with the overall sound quality and fit of the hearing aid(s).    DEVICE ORIENTATION:    The patient was counseled on device components and component function. Proper insertion and removal of the aid(s) was demonstrated. The patient practiced insertion and removal of the devices in the office, they demonstrated excellent ability to manipulate the hearing aids. The patient  was given the devices users manual that reviews aid usage and operation, hearing aid cleaning tools, and hearing aid carrying case.     The hearing aid warranty, including unlimited repair and a one time loss and damage per hearing aid, through the , as well  as [facility]'s hearing aid service plan, including unlimited office visits, and supplies were outlined thoroughly. The patient agreed to the terms of sale listed on the purchase agreement containing device specifications, warranties, pricing information, as well as [facility]'s 45-day trial period timeline. After this period has elapsed, hearing aids cannot be returned.    The aids were paired to her cell phone     DEVICE EXPECTATIONS & USAGE:     Hearing aids are assistive devices and are not designed to restore normal hearing sensitivity. The importance of realistic expectations, especially in the presence of background noise, was emphasized. The need for daily, consistent usage (8-12 hours per day) for proper device adjustment, as well as the importance of self-advocacy and practicing effective communication strategies was outlined.     Effective communication strategies include:  1.) Maintaining face-to-face communication, allowing for speechreading of facial expressions, lips, and gestures.  2.) Reducing background noise and distance between communication partners.  3.) Having communication partners reduce their rate of speech when appropriate.  4.) Beginning conversation by getting communication partner's attention.  5.) Asking for rephrasing of missed aspects of conversation rather than asking for repetition.    RECOMMENDATIONS:  The patient demonstrated understanding of all the topics discussed.     As this is an OTC contract, the patient chose to contact me when she is in need of a follow up as she is a previous user of hearing aids.      Efra Corea, CCC-A  Clinical Audiologist   [id number]  [facility] AUDIOLOG[facility]  [address] [address]

## 2024-08-08 DIAGNOSIS — Z12.31 SCREENING MAMMOGRAM, ENCOUNTER FOR: Primary | ICD-10-CM

## 2024-09-21 DIAGNOSIS — I10 HYPERTENSION, UNSPECIFIED TYPE: ICD-10-CM

## 2024-09-23 RX ORDER — LOSARTAN POTASSIUM 100 MG/1
100 TABLET ORAL DAILY
Qty: 30 TABLET | Refills: 0 | Status: SHIPPED | OUTPATIENT
Start: 2024-09-23

## 2024-10-04 ENCOUNTER — ANNUAL EXAM (OUTPATIENT)
Dept: OBGYN CLINIC | Facility: CLINIC | Age: 54
End: 2024-10-04
Payer: COMMERCIAL

## 2024-10-04 VITALS
SYSTOLIC BLOOD PRESSURE: 140 MMHG | DIASTOLIC BLOOD PRESSURE: 98 MMHG | WEIGHT: 194 LBS | BODY MASS INDEX: 32.28 KG/M2 | HEART RATE: 68 BPM

## 2024-10-04 DIAGNOSIS — Z12.11 ENCOUNTER FOR COLORECTAL CANCER SCREENING: ICD-10-CM

## 2024-10-04 DIAGNOSIS — Z01.419 WOMEN'S ANNUAL ROUTINE GYNECOLOGICAL EXAMINATION: Primary | ICD-10-CM

## 2024-10-04 DIAGNOSIS — Z12.12 ENCOUNTER FOR COLORECTAL CANCER SCREENING: ICD-10-CM

## 2024-10-04 PROCEDURE — 99396 PREV VISIT EST AGE 40-64: CPT | Performed by: NURSE PRACTITIONER

## 2024-10-04 PROCEDURE — G0476 HPV COMBO ASSAY CA SCREEN: HCPCS | Performed by: NURSE PRACTITIONER

## 2024-10-04 PROCEDURE — G0145 SCR C/V CYTO,THINLAYER,RESCR: HCPCS | Performed by: NURSE PRACTITIONER

## 2024-10-04 NOTE — PROGRESS NOTES
Subjective    HPI:     Honey Aguiar is a 54 y.o. postmenopausal female. She is a  2 Para 2, with  x 2. She has been  for 33 yrs. She denies issues with intimacy. She denies /GI and Gyn complaints. She feels safe at home. She denies depression/anxiety.  Medical, surgical and family history reviewed.  has stage 1 renal carcinoma. Her dental care is up-to-date. She eats a healthy diet and exercises regularly. She is happy with her weight.     Visit Vitals  /98   Pulse 68   Wt 88 kg (194 lb)   LMP 2022 (Approximate)   BMI 32.28 kg/m²   OB Status Postmenopausal   Smoking Status Never   BSA 1.95 m²       Gynecologic History    Patient's last menstrual period was 2022 (approximate).    Last Pap: 7/10/23. Results were: NILM with pos HPV other HR  Last mammogram: 23. Results were: normal  Colonoscopy: 2021 - negative    Obstetric and Medical History    OB History    Para Term  AB Living   2 2 2     2   SAB IAB Ectopic Multiple Live Births           2      # Outcome Date GA Lbr Cristopher/2nd Weight Sex Type Anes PTL Lv   2 Term            1 Term               Obstetric Comments   2 - uncomplicated pregnancies   1st pregnancy- [id number]   2nd pregnancy- [id number]       Past Medical History:   Diagnosis Date    Asthma had a history as a a kid/can we chat    Hard of hearing     HL (hearing loss)     Hypertension     Tympanic membrane perforation        Past Surgical History:   Procedure Laterality Date    INCONTINENCE SURGERY      bladder mesh sling    TUBAL LIGATION         The following portions of the patient's history were reviewed and updated as appropriate: allergies, current medications, past family history, past medical history, past social history, past surgical history, and problem list.    Review of Systems    Pertinent items are noted in HPI.      Objective    Physical Exam  Constitutional:       Appearance: Normal appearance. She is well-developed.    Genitourinary:      Vulva, bladder and urethral meatus normal.      No lesions in the vagina.      Right Labia: No rash, tenderness, lesions, skin changes or Bartholin's cyst.     Left Labia: No tenderness, lesions, skin changes, Bartholin's cyst or rash.     No labial fusion noted.      No inguinal adenopathy present in the right or left side.     No vaginal discharge, erythema, tenderness, bleeding or granulation tissue.      No vaginal prolapse present.     No vaginal atrophy present.       Right Adnexa: not tender, not full and no mass present.     Left Adnexa: not tender, not full and no mass present.     Cervix is parous.      No cervical motion tenderness, discharge, friability, lesion, polyp or nabothian cyst.      Uterus is not enlarged, tender, irregular or prolapsed.      No uterine mass detected.     Uterus is anteverted.      Pelvic exam was performed with patient in the lithotomy position.   Breasts:     Breasts are symmetrical.      Right: No inverted nipple, mass, nipple discharge, skin change or tenderness.      Left: No inverted nipple, mass, nipple discharge, skin change or tenderness.   HENT:      Head: Normocephalic and atraumatic.   Neck:      Thyroid: No thyromegaly.   Cardiovascular:      Rate and Rhythm: Normal rate and regular rhythm.      Heart sounds: Normal heart sounds, S1 normal and S2 normal.   Pulmonary:      Effort: Pulmonary effort is normal.      Breath sounds: Normal breath sounds.   Abdominal:      General: Bowel sounds are normal. There is no distension.      Palpations: Abdomen is soft. There is no mass.      Tenderness: There is no abdominal tenderness. There is no guarding.      Hernia: There is no hernia in the left inguinal area or right inguinal area.   Musculoskeletal:      Cervical back: Neck supple.   Lymphadenopathy:      Cervical: No cervical adenopathy.      Upper Body:      Right upper body: No supraclavicular or axillary adenopathy.      Left upper body: No  supraclavicular or axillary adenopathy.      Lower Body: No right inguinal adenopathy. No left inguinal adenopathy.   Neurological:      Mental Status: She is alert.   Skin:     General: Skin is warm and dry.      Findings: No rash.   Psychiatric:         Attention and Perception: Attention and perception normal.         Mood and Affect: Mood and affect normal.         Speech: Speech normal.         Behavior: Behavior is cooperative.         Thought Content: Thought content normal.         Cognition and Memory: Cognition and memory normal.         Judgment: Judgment normal.   Vitals and nursing note reviewed.          Assessment and Plan    Honey was seen today for gynecologic exam.    Diagnoses and all orders for this visit:    Women's annual routine gynecological examination  -     Liquid-based pap, screening    Encounter for colorectal cancer screening  -     Cologuard      Patient informed of a Stable GYN exam. A pap smear was performed.     I have discussed the importance of exercise and healthy diet as well as adequate intake of calcium and vitamin D. The current ASCCP guidelines were reviewed. The low risk patient will receive pap smear screening every 3 years until the age of 29 and then every 3 to 5 years with HPV co-testing from the ages of 30-65. I emphasized the importance of an annual pelvic and breast exam. A yearly mammogram is scheduled 11/22/2024.     Results will be released to [facility], if abnormal will call to review and discuss treatment plan.     All questions have been answered to her satisfaction.       Follow up in: 1 year or sooner if needed.

## 2024-10-07 LAB
HPV HR 12 DNA CVX QL NAA+PROBE: NEGATIVE
HPV16 DNA CVX QL NAA+PROBE: NEGATIVE
HPV18 DNA CVX QL NAA+PROBE: NEGATIVE

## 2024-10-11 LAB
LAB AP GYN PRIMARY INTERPRETATION: NORMAL
Lab: NORMAL

## 2024-11-22 ENCOUNTER — HOSPITAL ENCOUNTER (OUTPATIENT)
Dept: MAMMOGRAPHY | Facility: CLINIC | Age: 54
End: 2024-11-22
Payer: COMMERCIAL

## 2024-11-22 PROCEDURE — 77067 SCR MAMMO BI INCL CAD: CPT

## 2024-11-22 PROCEDURE — 77063 BREAST TOMOSYNTHESIS BI: CPT

## 2024-11-24 LAB — COLOGUARD RESULT REPORTABLE: NEGATIVE

## 2024-11-25 ENCOUNTER — RESULTS FOLLOW-UP (OUTPATIENT)
Dept: OBGYN CLINIC | Facility: CLINIC | Age: 54
End: 2024-11-25

## 2024-11-26 ENCOUNTER — RESULTS FOLLOW-UP (OUTPATIENT)
Dept: OBGYN CLINIC | Facility: CLINIC | Age: 54
End: 2024-11-26

## 2024-12-03 ENCOUNTER — TELEPHONE (OUTPATIENT)
Age: 54
End: 2024-12-03

## 2024-12-03 NOTE — TELEPHONE ENCOUNTER
I called pt to reschedule appt that was on 1/20/25. Left v/m that I rescheduled their appt to 2/4/25 at 10 am. I asked for them to call to confirm the appt or if we need to reschedule as the time and date doesn't work to let us know.

## 2024-12-04 ENCOUNTER — RA CDI HCC (OUTPATIENT)
Dept: OTHER | Facility: HOSPITAL | Age: 54
End: 2024-12-04

## 2024-12-04 NOTE — PROGRESS NOTES
HCC coding opportunities       Chart reviewed, no opportunity found: CHART REVIEWED, NO OPPORTUNITY FOUND        Patients Insurance        Commercial Insurance: [employer]

## 2024-12-11 ENCOUNTER — OFFICE VISIT (OUTPATIENT)
Dept: FAMILY MEDICINE CLINIC | Facility: CLINIC | Age: 54
End: 2024-12-11
Payer: COMMERCIAL

## 2024-12-11 ENCOUNTER — APPOINTMENT (OUTPATIENT)
Dept: LAB | Facility: CLINIC | Age: 54
End: 2024-12-11
Payer: COMMERCIAL

## 2024-12-11 VITALS
SYSTOLIC BLOOD PRESSURE: 140 MMHG | WEIGHT: 193 LBS | OXYGEN SATURATION: 97 % | RESPIRATION RATE: 14 BRPM | BODY MASS INDEX: 32.15 KG/M2 | DIASTOLIC BLOOD PRESSURE: 90 MMHG | HEIGHT: 65 IN | TEMPERATURE: 98 F | HEART RATE: 66 BPM

## 2024-12-11 DIAGNOSIS — Z13.0 SCREENING FOR DEFICIENCY ANEMIA: ICD-10-CM

## 2024-12-11 DIAGNOSIS — Z86.16 HISTORY OF COVID-19: ICD-10-CM

## 2024-12-11 DIAGNOSIS — Z13.1 SCREENING FOR DIABETES MELLITUS: ICD-10-CM

## 2024-12-11 DIAGNOSIS — Z13.29 SCREENING FOR THYROID DISORDER: ICD-10-CM

## 2024-12-11 DIAGNOSIS — Z00.00 ANNUAL PHYSICAL EXAM: ICD-10-CM

## 2024-12-11 DIAGNOSIS — Z00.00 ANNUAL PHYSICAL EXAM: Primary | ICD-10-CM

## 2024-12-11 DIAGNOSIS — I10 HYPERTENSION, UNSPECIFIED TYPE: ICD-10-CM

## 2024-12-11 DIAGNOSIS — Z13.220 SCREENING FOR LIPID DISORDERS: ICD-10-CM

## 2024-12-11 LAB
ALBUMIN SERPL BCG-MCNC: 4.7 G/DL (ref 3.5–5)
ALP SERPL-CCNC: 85 U/L (ref 34–104)
ALT SERPL W P-5'-P-CCNC: 15 U/L (ref 7–52)
ANION GAP SERPL CALCULATED.3IONS-SCNC: 6 MMOL/L (ref 4–13)
AST SERPL W P-5'-P-CCNC: 17 U/L (ref 13–39)
BILIRUB SERPL-MCNC: 0.57 MG/DL (ref 0.2–1)
BUN SERPL-MCNC: 12 MG/DL (ref 5–25)
CALCIUM SERPL-MCNC: 9.4 MG/DL (ref 8.4–10.2)
CHLORIDE SERPL-SCNC: 103 MMOL/L (ref 96–108)
CHOLEST SERPL-MCNC: 176 MG/DL (ref ?–200)
CO2 SERPL-SCNC: 31 MMOL/L (ref 21–32)
CREAT SERPL-MCNC: 0.83 MG/DL (ref 0.6–1.3)
ERYTHROCYTE [DISTWIDTH] IN BLOOD BY AUTOMATED COUNT: 13.2 % (ref 11.6–15.1)
EST. AVERAGE GLUCOSE BLD GHB EST-MCNC: 117 MG/DL
GFR SERPL CREATININE-BSD FRML MDRD: 80 ML/MIN/1.73SQ M
GLUCOSE P FAST SERPL-MCNC: 108 MG/DL (ref 65–99)
HBA1C MFR BLD: 5.7 %
HCT VFR BLD AUTO: 44.7 % (ref 34.8–46.1)
HDLC SERPL-MCNC: 55 MG/DL
HGB BLD-MCNC: 14 G/DL (ref 11.5–15.4)
LDLC SERPL CALC-MCNC: 100 MG/DL (ref 0–100)
LIPASE SERPL-CCNC: 31 U/L (ref 11–82)
MAGNESIUM SERPL-MCNC: 2.3 MG/DL (ref 1.9–2.7)
MCH RBC QN AUTO: 27.5 PG (ref 26.8–34.3)
MCHC RBC AUTO-ENTMCNC: 31.3 G/DL (ref 31.4–37.4)
MCV RBC AUTO: 88 FL (ref 82–98)
PLATELET # BLD AUTO: 292 THOUSANDS/UL (ref 149–390)
PMV BLD AUTO: 10.4 FL (ref 8.9–12.7)
POTASSIUM SERPL-SCNC: 5.1 MMOL/L (ref 3.5–5.3)
PROT SERPL-MCNC: 7 G/DL (ref 6.4–8.4)
RBC # BLD AUTO: 5.1 MILLION/UL (ref 3.81–5.12)
SL AMB  POCT GLUCOSE, UA: ABNORMAL
SL AMB LEUKOCYTE ESTERASE,UA: ABNORMAL
SL AMB POCT BILIRUBIN,UA: ABNORMAL
SL AMB POCT BLOOD,UA: ABNORMAL
SL AMB POCT CLARITY,UA: ABNORMAL
SL AMB POCT COLOR,UA: YELLOW
SL AMB POCT KETONES,UA: ABNORMAL
SL AMB POCT NITRITE,UA: ABNORMAL
SL AMB POCT PH,UA: 6
SL AMB POCT SPECIFIC GRAVITY,UA: 1.02
SL AMB POCT URINE PROTEIN: ABNORMAL
SL AMB POCT UROBILINOGEN: ABNORMAL
SODIUM SERPL-SCNC: 140 MMOL/L (ref 135–147)
TRIGL SERPL-MCNC: 106 MG/DL (ref ?–150)
TSH SERPL DL<=0.05 MIU/L-ACNC: 1.64 UIU/ML (ref 0.45–4.5)
WBC # BLD AUTO: 5.17 THOUSAND/UL (ref 4.31–10.16)

## 2024-12-11 PROCEDURE — 84443 ASSAY THYROID STIM HORMONE: CPT

## 2024-12-11 PROCEDURE — 80053 COMPREHEN METABOLIC PANEL: CPT

## 2024-12-11 PROCEDURE — 83690 ASSAY OF LIPASE: CPT

## 2024-12-11 PROCEDURE — 85027 COMPLETE CBC AUTOMATED: CPT

## 2024-12-11 PROCEDURE — 36415 COLL VENOUS BLD VENIPUNCTURE: CPT

## 2024-12-11 PROCEDURE — 99396 PREV VISIT EST AGE 40-64: CPT | Performed by: FAMILY MEDICINE

## 2024-12-11 PROCEDURE — 83036 HEMOGLOBIN GLYCOSYLATED A1C: CPT

## 2024-12-11 PROCEDURE — 83735 ASSAY OF MAGNESIUM: CPT

## 2024-12-11 PROCEDURE — 81002 URINALYSIS NONAUTO W/O SCOPE: CPT | Performed by: FAMILY MEDICINE

## 2024-12-11 PROCEDURE — 80061 LIPID PANEL: CPT

## 2024-12-11 NOTE — PROGRESS NOTES
Adult Annual Physical  Name: Honey Aguiar      : 1970      MRN: [id number]  Encounter Provider: Michelle Johnson MD  Encounter Date: 2024   Encounter department: [facility]    Assessment & Plan  Annual physical exam    Orders:  •  CBC; Future  •  Comprehensive metabolic panel; Future  •  Hemoglobin A1C; Future  •  Magnesium; Future  •  Lipid Panel with Direct LDL reflex; Future  •  Lipase; Future  •  TSH, 3rd generation; Future  •  POCT urine dip    Hypertension, unspecified type    Orders:  •  Comprehensive metabolic panel; Future  •  Magnesium; Future    History of COVID-19         Screening for lipid disorders    Orders:  •  Lipid Panel with Direct LDL reflex; Future  •  Lipase; Future    Screening for thyroid disorder    Orders:  •  TSH, 3rd generation; Future    Screening for deficiency anemia    Orders:  •  CBC; Future    Screening for diabetes mellitus    Orders:  •  Hemoglobin A1C; Future    BMI 32.0-32.9,adult         Immunizations and preventive care screenings were discussed with patient today. Appropriate education was printed on patient's after visit summary.    Counseling:  Dental Health: discussed importance of regular tooth brushing, flossing, and dental visits.  Injury prevention: discussed safety/seat belts, safety helmets, smoke detectors, carbon monoxide detectors, and smoking near bedding or upholstery.  Exercise: the importance of regular exercise/physical activity was discussed. Recommend exercise 3-5 times per week for at least 30 minutes.          History of Present Illness     Adult Annual Physical  Review of Systems   Constitutional:  Positive for fatigue. Negative for fever.   HENT:  Positive for hearing loss.    Eyes: Negative.    Respiratory:          RECINOS   Cardiovascular: Negative.    Gastrointestinal: Negative.    Endocrine:        Increased sweating   Genitourinary: Negative.    Musculoskeletal: Negative.    Skin: Negative.     Allergic/Immunologic: Positive for environmental allergies.   Neurological: Negative.    Psychiatric/Behavioral: Negative.       Past Medical History   Past Medical History:   Diagnosis Date   • Asthma had a history as a a kid/can we chat   • Hard of hearing    • HL (hearing loss)    • Hypertension    • Tympanic membrane perforation      Past Surgical History:   Procedure Laterality Date   • INCONTINENCE SURGERY      bladder mesh sling   • TUBAL LIGATION       Family History   Problem Relation Age of Onset   • Cancer Mother    • COPD Father    • Cancer Father    • Cancer Maternal Grandmother    • COPD Paternal Aunt    • Cancer Paternal Aunt    • Breast cancer Paternal Aunt    • Cancer Paternal Grandmother         Lung Cancer      reports that she has never smoked. She has never used smokeless tobacco. She reports that she does not currently use alcohol. She reports that she does not use drugs.  Current Outpatient Medications on File Prior to Visit   Medication Sig Dispense Refill   • Myrbetriq 50 MG TB24 TAKE 1 TABLET BY MOUTH EVERY DAY WITH WATER       No current facility-administered medications on file prior to visit.     Allergies   Allergen Reactions   • Penicillins Hives      Current Outpatient Medications on File Prior to Visit   Medication Sig Dispense Refill   • Myrbetriq 50 MG TB24 TAKE 1 TABLET BY MOUTH EVERY DAY WITH WATER       No current facility-administered medications on file prior to visit.      Social History     Tobacco Use   • Smoking status: Never   • Smokeless tobacco: Never   Vaping Use   • Vaping status: Never Used   Substance and Sexual Activity   • Alcohol use: Not Currently     Comment: once a year maybe one wine cooler or glass of wine   • Drug use: Never   • Sexual activity: Yes     Partners: Male     Birth control/protection: Female Sterilization     Immunization History   Administered Date(s) Administered   • COVID-19 PFIZER VACCINE 0.3 ML IM 04/22/2021, 05/27/2021   • Tdap  "09/30/2024   • Zoster Vaccine Recombinant 08/01/2020, 09/18/2020         Objective   /90 (BP Location: Left arm, Patient Position: Sitting, Cuff Size: Adult)   Pulse 66   Temp 98 °F (36.7 °C) (Temporal)   Resp 14   Ht 5' 5\" (1.651 m)   Wt 87.5 kg (193 lb)   LMP 05/01/2022 (Approximate)   SpO2 97%   BMI 32.12 kg/m²     Physical Exam  Vitals and nursing note reviewed.   Constitutional:       General: She is not in acute distress.     Appearance: Normal appearance. She is well-developed.   HENT:      Right Ear: Decreased hearing noted.      Left Ear: Decreased hearing noted.      Ears:      Comments: Wears b/l hearing aids     Nose: Nose normal.   Eyes:      General: No scleral icterus.     Conjunctiva/sclera: Conjunctivae normal.   Neck:      Thyroid: No thyromegaly.   Cardiovascular:      Rate and Rhythm: Normal rate and regular rhythm.      Heart sounds: Normal heart sounds.   Pulmonary:      Effort: Pulmonary effort is normal. No respiratory distress.      Breath sounds: Normal breath sounds.   Abdominal:      General: Bowel sounds are normal.      Palpations: Abdomen is soft.      Tenderness: There is no abdominal tenderness. There is no right CVA tenderness or left CVA tenderness.   Musculoskeletal:      Cervical back: Neck supple.      Right lower leg: No edema.      Left lower leg: No edema.   Skin:     General: Skin is warm and dry.      Coloration: Skin is not pale.   Neurological:      General: No focal deficit present.      Mental Status: She is alert.   Psychiatric:         Mood and Affect: Mood normal.         Behavior: Behavior normal.       "

## 2024-12-12 RX ORDER — LOSARTAN POTASSIUM 100 MG/1
100 TABLET ORAL DAILY
Qty: 30 TABLET | Refills: 0 | Status: SHIPPED | OUTPATIENT
Start: 2024-12-12

## 2025-01-08 ENCOUNTER — NURSE TRIAGE (OUTPATIENT)
Age: 55
End: 2025-01-08

## 2025-01-08 DIAGNOSIS — J01.00 ACUTE MAXILLARY SINUSITIS, RECURRENCE NOT SPECIFIED: Primary | ICD-10-CM

## 2025-01-08 RX ORDER — PREDNISONE 20 MG/1
40 TABLET ORAL DAILY
Qty: 10 TABLET | Refills: 0 | Status: SHIPPED | OUTPATIENT
Start: 2025-01-08 | End: 2025-01-13

## 2025-01-08 RX ORDER — AZITHROMYCIN 250 MG/1
TABLET, FILM COATED ORAL
Qty: 6 TABLET | Refills: 0 | Status: SHIPPED | OUTPATIENT
Start: 2025-01-08 | End: 2025-01-13

## 2025-01-08 NOTE — TELEPHONE ENCOUNTER
"Patient reports possible sinus infection since 1/5/25. States has had them in the past and it is similar to how she felt then. Reports left sided sinus pain/pressure that is moderate in nature, Also reports clear sputum, dry cough, and sore throat. Denies fever, chest pain and SOB. Requesting PCP send in prescription for antibiotic if possible. No  OV available today that work with patient schedule. Please reach out to patient with provider response. Patient instructed to call back with any changes/anything worsens. Verbalized understanding.         Reason for Disposition   Sinus congestion as part of a cold, present < 10 days    Answer Assessment - Initial Assessment Questions  1. LOCATION: \"Where does it hurt?\"       Left sinuses-where usually  2. ONSET: \"When did the sinus pain start?\"  (e.g., hours, days)       1/5/25  3. SEVERITY: \"How bad is the pain?\"   (Scale 0-10; or none, mild, moderate or severe)      Mild-moderate  4. RECURRENT SYMPTOM: \"Have you ever had sinus problems before?\" If Yes, ask: \"When was the last time?\" and \"What happened that time?\"       Patient reports having sinus infections in the past and similar to that  5. NASAL CONGESTION: \"Is the nose blocked?\" If Yes, ask: \"Can you open it or must you breathe through your mouth?\"      yes  6. NASAL DISCHARGE: \"Do you have discharge from your nose?\" If so ask, \"What color?\"      clear  7. FEVER: \"Do you have a fever?\" If Yes, ask: \"What is it, how was it measured, and when did it start?\"       Denies   8. OTHER SYMPTOMS: \"Do you have any other symptoms?\" (e.g., sore throat, cough, earache, difficulty breathing)      Sore throat, dry cough,  9. PREGNANCY: \"Is there any chance you are pregnant?\" \"When was your last menstrual period?\"      Denies    Protocols used: Sinus Pain or Congestion-Adult-[place]    "

## 2025-01-09 NOTE — TELEPHONE ENCOUNTER
Lm for pt--sent in rxs for zpk and prednisone--if no relief can add to my schedule for Friday for further eval--

## 2025-02-04 DIAGNOSIS — I10 HYPERTENSION, UNSPECIFIED TYPE: ICD-10-CM

## 2025-02-05 RX ORDER — LOSARTAN POTASSIUM 100 MG/1
100 TABLET ORAL DAILY
Qty: 90 TABLET | Refills: 0 | Status: SHIPPED | OUTPATIENT
Start: 2025-02-05

## 2025-02-05 RX ORDER — LOSARTAN POTASSIUM 100 MG/1
100 TABLET ORAL DAILY
Qty: 30 TABLET | Refills: 0 | OUTPATIENT
Start: 2025-02-05

## 2025-02-06 DIAGNOSIS — J01.01 ACUTE RECURRENT MAXILLARY SINUSITIS: Primary | ICD-10-CM

## 2025-02-06 RX ORDER — DOXYCYCLINE HYCLATE 100 MG
100 TABLET ORAL 2 TIMES DAILY
Qty: 14 TABLET | Refills: 0 | Status: SHIPPED | OUTPATIENT
Start: 2025-02-06 | End: 2025-02-13

## 2025-02-06 RX ORDER — METHYLPREDNISOLONE 4 MG/1
TABLET ORAL
Qty: 21 EACH | Refills: 0 | Status: SHIPPED | OUTPATIENT
Start: 2025-02-06

## 2025-02-11 DIAGNOSIS — B37.9 YEAST INFECTION: Primary | ICD-10-CM

## 2025-02-11 RX ORDER — FLUCONAZOLE 150 MG/1
150 TABLET ORAL ONCE
Qty: 2 TABLET | Refills: 0 | Status: SHIPPED | OUTPATIENT
Start: 2025-02-11 | End: 2025-02-11

## 2025-02-20 DIAGNOSIS — Z00.6 ENCOUNTER FOR EXAMINATION FOR NORMAL COMPARISON OR CONTROL IN CLINICAL RESEARCH PROGRAM: ICD-10-CM

## 2025-03-19 ENCOUNTER — APPOINTMENT (OUTPATIENT)
Dept: LAB | Facility: CLINIC | Age: 55
End: 2025-03-19

## 2025-03-19 DIAGNOSIS — Z00.6 ENCOUNTER FOR EXAMINATION FOR NORMAL COMPARISON OR CONTROL IN CLINICAL RESEARCH PROGRAM: ICD-10-CM

## 2025-03-19 PROCEDURE — 36415 COLL VENOUS BLD VENIPUNCTURE: CPT

## 2025-04-01 LAB
APOB+LDLR+PCSK9 GENE MUT ANL BLD/T: NOT DETECTED
BRCA1+BRCA2 DEL+DUP + FULL MUT ANL BLD/T: NOT DETECTED
MLH1+MSH2+MSH6+PMS2 GN DEL+DUP+FUL M: NOT DETECTED

## 2025-04-02 NOTE — PROGRESS NOTES
Progress Note - Cardiology Office  [facility]    Honey Aguiar 55 y.o. female MRN: [id number]  : 1970  Encounter: [id number]      Assessment & Plan  Hypertension, unspecified type    RECINOS (dyspnea on exertion)    Precordial pain    BMI 32.0-32.9,adult    History of COVID-19       ASSESSMENT:  Previous history of Chest Pain, occasional left upper chest unrelated to any particular level of activity.  She had no accompanying symptoms.  Patient attributes it probably to stress    TTE, 2023:  EF 55%, mild TR    Stress test, 2023:  Negative for ischemia     Essential hypertension  BP today is 134/90 with heart rate 67/min  Currently on losartan 100 mg  Will add amlodipine 2.5 mg  Nurse visit in 3 weeks to check blood pressure and titrate medications     Obesity, BMI 31.62> 32.45     Dyspnea on exertion     History of COVID-19 infection              RECOMMENDATIONS:  Continue losartan to 100 mg daily  Amlodipine 2.5 mg daily  Nurse visit in 3 weeks to recheck blood pressure and titrate medications  Advised on low-salt/sodium diet  Regular cardiovascular exercise and weight loss      Please call [phone] if any questions.    HPI :     Honey Aguiar is a 55 y.o. year old female who came for follow up.  She generally feels well and denies any symptoms today.  Her blood pressure is still elevated at 130/90.  I am going to add amlodipine to her current losartan.  I also advised her on low-salt diet    REVIEW OF SYSTEMS:  Denies any new or acute cardiac symptoms today.  Denies chest pain, dyspnea, palpitations or syncope      Historical Information   Past Medical History:   Diagnosis Date    Asthma had a history as a a kid/can we chat    Hard of hearing     HL (hearing loss)     Hypertension     Tympanic membrane perforation      Past Surgical History:   Procedure Laterality Date    INCONTINENCE SURGERY      bladder mesh sling    TUBAL LIGATION       Social History     Substance  "and Sexual Activity   Alcohol Use Not Currently    Comment: once a year maybe one wine cooler or glass of wine     Social History     Substance and Sexual Activity   Drug Use Never     Social History     Tobacco Use   Smoking Status Never   Smokeless Tobacco Never     Family History:   Family History   Problem Relation Age of Onset    Cancer Mother     COPD Father     Cancer Father     Cancer Maternal Grandmother     COPD Paternal Aunt     Cancer Paternal Aunt     Breast cancer Paternal Aunt     Cancer Paternal Grandmother         Lung Cancer       Meds/Allergies     Allergies   Allergen Reactions    Penicillins Hives       Current Outpatient Medications:     amLODIPine (NORVASC) 2.5 mg tablet, Take 1 tablet (2.5 mg total) by mouth daily, Disp: 90 tablet, Rfl: 2    losartan (COZAAR) 100 MG tablet, TAKE 1 TABLET BY MOUTH EVERY DAY, Disp: 90 tablet, Rfl: 0    Myrbetriq 50 MG TB24, TAKE 1 TABLET BY MOUTH EVERY DAY WITH WATER, Disp: , Rfl:     methylPREDNISolone 4 MG tablet therapy pack, Use as directed on package, Disp: 21 each, Rfl: 0    Vitals: Blood pressure 134/90, pulse 67, height 5' 5\" (1.651 m), weight 88.5 kg (195 lb), last menstrual period 05/01/2022, SpO2 97%.    Body mass index is 32.45 kg/m².  Vitals:    04/07/25 0815   Weight: 88.5 kg (195 lb)     BP Readings from Last 3 Encounters:   04/07/25 134/90   12/11/24 140/90   10/04/24 140/98       Physical Exam:  Physical Exam    Neurologic:  Alert & oriented x 3, no new focal deficits, Not in any acute distress,  Constitutional: Obese  Eyes:  Pupil equal and reacting to light, conjunctiva normal,   HENT:  Atraumatic, oropharynx moist, Neck- normal range of motion, no tenderness,  Neck supple, No JVP, No LNP   Respiratory:  Bilateral air entry, mostly clear to auscultation  Cardiovascular: S1-S2 regular with a I/VI systolic murmur   GI:  Soft, nondistended, normal bowel sounds, nontender, no hepatosplenomegaly appreciated.  Musculoskeletal:  No tenderness, no " "deformities.   Skin:  Well hydrated, no rash   Lymphatic:  No lymphadenopathy noted   Extremities:  No edema and distal pulses are present        Diagnostic Studies Review Cardio:      EKG: Normal sinus rhythm, heart rate 67/min    Cardiac testing:     Results for orders placed during the hospital encounter of 12/05/23    Echo complete w/ contrast if indicated    Interpretation Summary    Left Ventricle: Left ventricular cavity size is normal. Wall thickness is normal. The left ventricular ejection fraction is 55% by visual estimation. Systolic function is normal. Wall motion is normal. Diastolic function is normal for age.  Left atrial filling pressure is normal.    Tricuspid Valve: There is mild regurgitation. The right ventricular systolic pressure is normal. The estimated right ventricular systolic pressure is 30.00 mmHg.      Imaging:  Chest X-Ray:   No Chest XR results available for this patient.    CT-scan of the chest:     No CTA results available for this patient.  Lab Review   Lab Results   Component Value Date    WBC 5.17 12/11/2024    HGB 14.0 12/11/2024    HCT 44.7 12/11/2024    MCV 88 12/11/2024    RDW 13.2 12/11/2024     12/11/2024     BMP:  Lab Results   Component Value Date    SODIUM 140 12/11/2024    K 5.1 12/11/2024     12/11/2024    CO2 31 12/11/2024    BUN 12 12/11/2024    CREATININE 0.83 12/11/2024    GLUC 88 10/01/2021    GLUF 108 (H) 12/11/2024    CALCIUM 9.4 12/11/2024    EGFR 80 12/11/2024    MG 2.3 12/11/2024     LFT:  Lab Results   Component Value Date    AST 17 12/11/2024    ALT 15 12/11/2024    ALKPHOS 85 12/11/2024    TP 7.0 12/11/2024    ALB 4.7 12/11/2024      No components found for: \"TSH3\"  Lab Results   Component Value Date    UXF0FEANYUQI 1.638 12/11/2024     Lab Results   Component Value Date    HGBA1C 5.7 (H) 12/11/2024     Lipid Profile:   Lab Results   Component Value Date    CHOLESTEROL 176 12/11/2024    HDL 55 12/11/2024    LDLCALC 100 12/11/2024    TRIG 106 " "12/11/2024     Lab Results   Component Value Date    CHOLESTEROL 176 12/11/2024    CHOLESTEROL 192 07/27/2023     No results found for: \"CKTOTAL\", \"CKMB\", \"CKMBINDEX\", \"TROPONINI\"  No results found for: \"NTBNP\"   Recent Results (from the past 4 weeks)   [facility] Molecular Screen    Collection Time: 03/19/25  7:54 AM    Specimen: Arm, Right; Blood   Result Value Ref Range    [place] SYNDROME DNA ANALYSIS Not Detected     HEREDITARY BREAST & OVARIAN CANCER DNA ANALYSIS Not Detected     FAMILIAL HYPERCHOLESTEROLEMIA DNA ANALYSIS Not Detected              Dr. Sheila Arenas MD, [facility]      \"This note has been constructed using a voice recognition system.Therefore there may be syntax, spelling, and/or grammatical errors. Please call if you have any questions. \"  "

## 2025-04-07 ENCOUNTER — OFFICE VISIT (OUTPATIENT)
Dept: CARDIOLOGY CLINIC | Facility: CLINIC | Age: 55
End: 2025-04-07
Payer: COMMERCIAL

## 2025-04-07 VITALS
HEART RATE: 67 BPM | DIASTOLIC BLOOD PRESSURE: 90 MMHG | BODY MASS INDEX: 32.49 KG/M2 | OXYGEN SATURATION: 97 % | HEIGHT: 65 IN | WEIGHT: 195 LBS | SYSTOLIC BLOOD PRESSURE: 134 MMHG

## 2025-04-07 DIAGNOSIS — Z86.16 HISTORY OF COVID-19: ICD-10-CM

## 2025-04-07 DIAGNOSIS — I10 HYPERTENSION, UNSPECIFIED TYPE: Primary | ICD-10-CM

## 2025-04-07 DIAGNOSIS — R06.09 DOE (DYSPNEA ON EXERTION): ICD-10-CM

## 2025-04-07 DIAGNOSIS — R07.2 PRECORDIAL PAIN: ICD-10-CM

## 2025-04-07 PROCEDURE — 99214 OFFICE O/P EST MOD 30 MIN: CPT | Performed by: INTERNAL MEDICINE

## 2025-04-07 PROCEDURE — 93000 ELECTROCARDIOGRAM COMPLETE: CPT | Performed by: INTERNAL MEDICINE

## 2025-04-07 RX ORDER — AMLODIPINE BESYLATE 2.5 MG/1
2.5 TABLET ORAL DAILY
Qty: 90 TABLET | Refills: 2 | Status: SHIPPED | OUTPATIENT
Start: 2025-04-07

## 2025-05-08 DIAGNOSIS — I10 HYPERTENSION, UNSPECIFIED TYPE: ICD-10-CM

## 2025-05-09 DIAGNOSIS — I10 HYPERTENSION, UNSPECIFIED TYPE: ICD-10-CM

## 2025-05-09 RX ORDER — LOSARTAN POTASSIUM 100 MG/1
100 TABLET ORAL DAILY
Qty: 90 TABLET | Refills: 0 | OUTPATIENT
Start: 2025-05-09

## 2025-05-09 RX ORDER — LOSARTAN POTASSIUM 100 MG/1
100 TABLET ORAL DAILY
Qty: 90 TABLET | Refills: 1 | Status: SHIPPED | OUTPATIENT
Start: 2025-05-09

## 2025-05-19 ENCOUNTER — APPOINTMENT (OUTPATIENT)
Dept: LAB | Facility: CLINIC | Age: 55
End: 2025-05-19
Attending: PHYSICIAN ASSISTANT
Payer: COMMERCIAL

## 2025-05-19 ENCOUNTER — OFFICE VISIT (OUTPATIENT)
Age: 55
End: 2025-05-19

## 2025-05-19 VITALS — TEMPERATURE: 97.5 F | HEIGHT: 65 IN | WEIGHT: 196.6 LBS | BODY MASS INDEX: 32.76 KG/M2

## 2025-05-19 DIAGNOSIS — L50.3 DERMATOGRAPHISM: ICD-10-CM

## 2025-05-19 DIAGNOSIS — D22.9 MULTIPLE BENIGN MELANOCYTIC NEVI: ICD-10-CM

## 2025-05-19 DIAGNOSIS — J31.0 CHRONIC RHINITIS: ICD-10-CM

## 2025-05-19 DIAGNOSIS — L81.4 LENTIGO: ICD-10-CM

## 2025-05-19 DIAGNOSIS — L82.1 SEBORRHEIC KERATOSIS: Primary | ICD-10-CM

## 2025-05-19 PROCEDURE — 82785 ASSAY OF IGE: CPT

## 2025-05-19 PROCEDURE — 86003 ALLG SPEC IGE CRUDE XTRC EA: CPT

## 2025-05-19 PROCEDURE — 36415 COLL VENOUS BLD VENIPUNCTURE: CPT

## 2025-05-19 RX ORDER — FLUTICASONE PROPIONATE 50 MCG
SPRAY, SUSPENSION (ML) NASAL
COMMUNITY
Start: 2025-05-09

## 2025-05-19 NOTE — PROGRESS NOTES
"[facility] Dermatology Clinic Note     Patient Name: Honey Aguiar  Encounter Date: 5/19/25       Have you been cared for by a [facility] Dermatologist in the last 3 years and, if so, which description applies to you? NO. I am considered a \"new\" patient and must complete all patient intake questions. I am of child-bearing potential.     REVIEW OF SYSTEMS:  Have you recently had or currently have any of the following? Recent fever or chills? No  Any non-healing wound? No  Are you pregnant or planning to become pregnant? No  Are you currently or planning to be nursing or breast feeding? No   PAST MEDICAL HISTORY:  Have you personally ever had or currently have any of the following?  If \"YES,\" then please provide more detail. Skin cancer (such as Melanoma, Basal Cell Carcinoma, Squamous Cell Carcinoma?  No  Tuberculosis, HIV/AIDS, Hepatitis B or C: No  Radiation Treatment No   HISTORY OF IMMUNOSUPPRESSION:   Do you have a history of any of the following:  Systemic Immunosuppression such as Diabetes, Biologic or Immunotherapy, Chemotherapy, Organ Transplantation, Bone Marrow Transplantation or Prednsione?  No    Answering \"YES\" requires the addition of the dotphrase \"IMMUNOSUPPRESSED\" as the first diagnosis of the patient's visit.   FAMILY HISTORY:  Any \"first degree relatives\" (parent, brother, sister, or child) with the following?    Skin Cancer, Pancreatic or Other Cancer? No   PATIENT EXPERIENCE:    Do you want the Dermatologist to perform a COMPLETE skin exam today including a clinical examination under the \"bra and underwear\" areas?  Yes  If necessary, do we have your permission to call and leave a detailed message on your Preferred Phone number that includes your specific medical information?  Yes      Allergies[1] Current Medications[2]              Whom besides the patient is providing clinical information about today's encounter?   NO ADDITIONAL HISTORIAN (patient alone provided history)    Physical Exam and " Assessment/Plan by Diagnosis:  New patient presents today for full body skin exam. Patient was previously a patient of [facility]. Patient states charts are linked now with . Patient states she has about 100 moles but nothing every came back from previous dermatologist as cancerous. Denies family history as well for skin cancers.    SEBORRHEIC KERATOSES- Left chest, back of left knee  - Relevant exam: Scattered over the trunk/extremities are waxy brown to black plaques and papules with stuck on appearance  - Exam and clinical history consistent with seborrheic keratoses  - Counseled that these are benign growths that do not require treatment  - Counseled that removal of lesions is considered cosmetic and so would incur a fee should patient elect to move forward.   - Patient to hold on treatments for now but will inform us should they desire additional treatments    MELANOCYTIC NEVI  -Relevant exam: Scattered over the trunk/extremities are homogenously pigmented brown macules and papules. ELM performed and without concerning findings.  - Exam and clinical history consistent with melanocytic nevi  - Educated on the ABCDE's of melanoma; handout provided  - Counseled to return to clinic prior to scheduled appointment should any of these lesions change or should any new lesions of concern arise  - Counseled on use of sun protection daily. Reviewed latest FDA sunscreen guidelines, including use of broad spectrum (UVA and UVB blocking) sunscreen or sun protective clothing with SPF 30-50 every 2-3 hours and reapplied after exposure to water; use of photoprotective clothing, including a broad brim hat and UPF rated clothing if outdoors for several hours; avoid use of tanning beds as these pose significant risk for melanoma and skin cancer.    LENTIGINES  OTHER SKIN CHANGES DUE TO CHRONIC EXPOSURE TO NONIONIZING RADIATION  - Relevant exam: Over sun exposed areas are brown macules. ELM performed and without  concerning findings.  - Exam and clinical history consistent with lentigines.  - Educated that these are indicative of prior sun exposure.   - Counseled to return to clinic prior to scheduled appointment should any of these lesions change or should any new lesions of concern arise.  - Recommended use of sunscreen as above and below.  - Counseled on use of sun protection daily. Reviewed latest FDA sunscreen guidelines, including use of broad spectrum (UVA and UVB blocking) sunscreen or sun protective clothing with SPF 30-50 every 2-3 hours and reapplied after exposure to water; use of photoprotective clothing, including a broad brim hat and UPF rated clothing if outdoors for several hours; avoid use of tanning beds as these pose significant risk for melanoma and skin cancer.    DERMATOGRAPHISM    Physical Exam:  Anatomic Location Affected: Right Deltoid  Morphological Description: Edematous wheal on erythematous base after light stroking of skin    History of Present Condition:  Found on exam    Assessment and Plan:  Based on a thorough discussion of this condition and the management approach to it (including a comprehensive discussion of the known risks, side effects and potential benefits of treatment), the patient (family) agrees to implement the following specific plan:  Double dose of Claritin     What is dermographism?  Dermographism (aka Dermatographism) is an exaggerated wealing tendency when the skin is stroked. It is the commonest form of physical or inducible urticaria. It is also called dermatographism, dermatographia and dermatographic urticaria.  In 25-50% of normal people firm stroking of the skin produces first a white line, then a red line, then slight swelling down the line of the stroke, and a mild red flare in the surrounding skin. In 5% of the population, this response is exaggerated enough to be called dermographism. In only a minority of these does it cause any symptoms.    What is the cause of  dermographism?    The exact cause of dermographic urticaria is unknown. Histamine is the main chemical released by mast cells when the skin is stroked, but other chemical mediators may also be involved.  Some patients with severe dermographism may carry an autoantibody to some unknown cutaneous protein.    Occasionally dermographism is triggered by an allergy to some external agent such as penicillin, scabies or a worm infestation. Most people with dermographism do not have an allergy.    Who gets dermographism?  Dermographism can appear at any age, including children, but it is most common in young adults.   Dermographism can occur with other types of urticaria (hives) including those due to cold or pressure.   An association with thyroid disease has been noted, but is likely a reflection of an underlying tendency to autoimmune disease and does not appear to have a causative relationship.    People with dermographism are usually otherwise healthy.     What are the clinical features of dermographism?  The onset of dermographism is usually gradual, but in some, the condition develops over a few days. Aggravating factors may include:  Hot conditions, for example, a warm bath, shower or bed   Towelling after bathing   After exercise, especially if it is accompanied by knocks, such as occur in rugby, wrestling or boxing   Minor pressure from clothing, chair seats, working with tools, clapping the hands, and other minor forms of pressure on the skin   Nervousness, agitation and worrying situations.    Once a few weals develop, subsequent scratching readily starts others in the vicinity.  The weals are usually on the surface but some deep extension may occur and giant weals develop.   The weals die away rapidly and usually clear after half to one hour.    What is the treatment for dermographism?  General measures include avoiding the stimuli that set off bouts of itch, where possible. For example:  Choose comfortable,  loose clothing   Avoid exposure to very hot water   Pat dry gently after bathing   If suspicious of a drug cause, consider stopping it if safe to do so.    Antihistamines usually give good relief from symptoms. Non-sedating options include:  Cetirizine   Loratadine   Fexofenadine    Antihistamines may need to be continued daily for at least several months; intermittent therapy is of less value.  Dermographism may also respond to phototherapy.    What is the outcome of dermographism?  Dermographism may last for months or go on indefinitely. In many patients, however, it clears within a year or two, or at least the wealing is reduced to a degree which no longer causes significant symptoms.       Scribe Attestation      I,:  Heide Licona am acting as a scribe while in the presence of the attending physician.:       I,:  Meghan Davis MD personally performed the services described in this documentation    as scribed in my presence.:                   [1]   Allergies  Allergen Reactions    Penicillins Hives   [2]   Current Outpatient Medications:     amLODIPine (NORVASC) 2.5 mg tablet, Take 1 tablet (2.5 mg total) by mouth daily, Disp: 90 tablet, Rfl: 2    losartan (COZAAR) 100 MG tablet, TAKE 1 TABLET BY MOUTH EVERY DAY, Disp: 90 tablet, Rfl: 1    methylPREDNISolone 4 MG tablet therapy pack, Use as directed on package, Disp: 21 each, Rfl: 0    Myrbetriq 50 MG TB24, TAKE 1 TABLET BY MOUTH EVERY DAY WITH WATER, Disp: , Rfl:

## 2025-05-19 NOTE — PATIENT INSTRUCTIONS
SEBORRHEIC KERATOSES- Left chest, back of left knee  - Relevant exam: Scattered over the trunk/extremities are waxy brown to black plaques and papules with stuck on appearance  - Exam and clinical history consistent with seborrheic keratoses  - Counseled that these are benign growths that do not require treatment  - Counseled that removal of lesions is considered cosmetic and so would incur a fee should patient elect to move forward.   - Patient to hold on treatments for now but will inform us should they desire additional treatments    MELANOCYTIC NEVI  -Relevant exam: Scattered over the trunk/extremities are homogenously pigmented brown macules and papules. ELM performed and without concerning findings.  - Exam and clinical history consistent with melanocytic nevi  - Educated on the ABCDE's of melanoma; handout provided  - Counseled to return to clinic prior to scheduled appointment should any of these lesions change or should any new lesions of concern arise  - Counseled on use of sun protection daily. Reviewed latest FDA sunscreen guidelines, including use of broad spectrum (UVA and UVB blocking) sunscreen or sun protective clothing with SPF 30-50 every 2-3 hours and reapplied after exposure to water; use of photoprotective clothing, including a broad brim hat and UPF rated clothing if outdoors for several hours; avoid use of tanning beds as these pose significant risk for melanoma and skin cancer.    LENTIGINES  OTHER SKIN CHANGES DUE TO CHRONIC EXPOSURE TO NONIONIZING RADIATION  - Relevant exam: Over sun exposed areas are brown macules. ELM performed and without concerning findings.  - Exam and clinical history consistent with lentigines.  - Educated that these are indicative of prior sun exposure.   - Counseled to return to clinic prior to scheduled appointment should any of these lesions change or should any new lesions of concern arise.  - Recommended use of sunscreen as above and below.  - Counseled on use  of sun protection daily. Reviewed latest FDA sunscreen guidelines, including use of broad spectrum (UVA and UVB blocking) sunscreen or sun protective clothing with SPF 30-50 every 2-3 hours and reapplied after exposure to water; use of photoprotective clothing, including a broad brim hat and UPF rated clothing if outdoors for several hours; avoid use of tanning beds as these pose significant risk for melanoma and skin cancer.    DERMATOGRAPHISM    What is dermographism?  Dermographism (aka Dermatographism) is an exaggerated wealing tendency when the skin is stroked. It is the commonest form of physical or inducible urticaria. It is also called dermatographism, dermatographia and dermatographic urticaria.  In 25-50% of normal people firm stroking of the skin produces first a white line, then a red line, then slight swelling down the line of the stroke, and a mild red flare in the surrounding skin. In 5% of the population, this response is exaggerated enough to be called dermographism. In only a minority of these does it cause any symptoms.    What is the cause of dermographism?    The exact cause of dermographic urticaria is unknown. Histamine is the main chemical released by mast cells when the skin is stroked, but other chemical mediators may also be involved.  Some patients with severe dermographism may carry an autoantibody to some unknown cutaneous protein.    Occasionally dermographism is triggered by an allergy to some external agent such as penicillin, scabies or a worm infestation. Most people with dermographism do not have an allergy.    Who gets dermographism?  Dermographism can appear at any age, including children, but it is most common in young adults.   Dermographism can occur with other types of urticaria (hives) including those due to cold or pressure.   An association with thyroid disease has been noted, but is likely a reflection of an underlying tendency to autoimmune disease and does not appear  to have a causative relationship.    People with dermographism are usually otherwise healthy.     What are the clinical features of dermographism?  The onset of dermographism is usually gradual, but in some, the condition develops over a few days. Aggravating factors may include:  Hot conditions, for example, a warm bath, shower or bed   Towelling after bathing   After exercise, especially if it is accompanied by knocks, such as occur in rugby, wrestling or boxing   Minor pressure from clothing, chair seats, working with tools, clapping the hands, and other minor forms of pressure on the skin   Nervousness, agitation and worrying situations.    Once a few weals develop, subsequent scratching readily starts others in the vicinity.  The weals are usually on the surface but some deep extension may occur and giant weals develop.   The weals die away rapidly and usually clear after half to one hour.    What is the treatment for dermographism?  General measures include avoiding the stimuli that set off bouts of itch, where possible. For example:  Choose comfortable, loose clothing   Avoid exposure to very hot water   Pat dry gently after bathing   If suspicious of a drug cause, consider stopping it if safe to do so.    Antihistamines usually give good relief from symptoms. Non-sedating options include:  Cetirizine   Loratadine   Fexofenadine    Antihistamines may need to be continued daily for at least several months; intermittent therapy is of less value.  Dermographism may also respond to phototherapy.    What is the outcome of dermographism?  Dermographism may last for months or go on indefinitely. In many patients, however, it clears within a year or two, or at least the wealing is reduced to a degree which no longer causes significant symptoms.

## 2025-05-20 LAB
A ALTERNATA IGE QN: <0.1 KUA/I (ref 0–0.1)
A FUMIGATUS IGE QN: <0.1 KUA/I (ref 0–0.1)
BERMUDA GRASS IGE QN: <0.1 KUA/I (ref 0–0.1)
BOXELDER IGE QN: <0.1 KUA/I (ref 0–0.1)
C HERBARUM IGE QN: <0.1 KUA/I (ref 0–0.1)
CAT DANDER IGE QN: <0.1 KUA/I (ref 0–0.1)
CMN PIGWEED IGE QN: <0.1 KUA/I (ref 0–0.1)
COMMON RAGWEED IGE QN: <0.1 KUA/I (ref 0–0.1)
COTTONWOOD IGE QN: <0.1 KUA/I (ref 0–0.1)
D FARINAE IGE QN: <0.1 KUA/I (ref 0–0.1)
D PTERONYSS IGE QN: <0.1 KUA/I (ref 0–0.1)
DOG DANDER IGE QN: <0.1 KUA/I (ref 0–0.1)
LONDON PLANE IGE QN: <0.1 KUA/I (ref 0–0.1)
MOUSE URINE PROT IGE QN: <0.1 KUA/I (ref 0–0.1)
MT JUNIPER IGE QN: <0.1 KUA/I (ref 0–0.1)
MUGWORT IGE QN: <0.1 KUA/I (ref 0–0.1)
P NOTATUM IGE QN: <0.1 KUA/I (ref 0–0.1)
ROACH IGE QN: <0.1 KUA/I (ref 0–0.1)
SHEEP SORREL IGE QN: <0.1 KUA/I (ref 0–0.1)
SILVER BIRCH IGE QN: <0.1 KUA/I (ref 0–0.1)
TIMOTHY IGE QN: <0.1 KUA/I (ref 0–0.1)
TOTAL IGE SMQN RAST: 6.52 KU/L (ref 0–113)
WALNUT IGE QN: <0.1 KUA/I (ref 0–0.1)
WHITE ASH IGE QN: <0.1 KUA/I (ref 0–0.1)
WHITE ELM IGE QN: <0.1 KUA/I (ref 0–0.1)
WHITE MULBERRY IGE QN: <0.1 KUA/I (ref 0–0.1)
WHITE OAK IGE QN: <0.1 KUA/I (ref 0–0.1)

## 2025-06-25 ENCOUNTER — NURSE TRIAGE (OUTPATIENT)
Age: 55
End: 2025-06-25

## 2025-06-25 NOTE — TELEPHONE ENCOUNTER
"REASON FOR CONVERSATION: Hypertension    SYMPTOMS: Received call from pt asking if her BP is too high being that she is taking two BP meds. She stated she just started taking it 2 days ago and the readings are as follows:  Yesterday: 139/93, Today: 145/97, 135/98 (now). She denies any symptoms other than some chest pressure when she feels she has anxiety.     OTHER HEALTH INFORMATION: hx HTN currently taking losartan 100mg and norvasc 2.5mg    PROTOCOL DISPOSITION: Discuss with Provider and Call Back Patient (overriding See Within 2 Weeks in Office)    CARE ADVICE PROVIDED: Advised pt to start keeping a log of her BP's taken one hour after taking her BP meds. Advised pt will also send a message to the provider to review and advise.    PRACTICE FOLLOW-UP: Please return call to pt with provider advice.       Reason for Disposition   Systolic BP >= 130 OR Diastolic >= 80, and is taking BP medications    Answer Assessment - Initial Assessment Questions  1. BLOOD PRESSURE: \"What is your blood pressure?\" \"Did you take at least two measurements 5 minutes apart?\"      Yesterday: 139/93,       Today: 145/97, 135/98 (now)  2. ONSET: \"When did you take your blood pressure?\"      2 days ago   3. HOW: \"How did you take your blood pressure?\" (e.g., automatic home BP monitor, visiting nurse)      Automatic at home   4. HISTORY: \"Do you have a history of high blood pressure?\"      HTN  5. MEDICINES: \"Are you taking any medicines for blood pressure?\" \"Have you missed any doses recently?\"      Losartan 100mg daily and amlodipine 2.5mg daily   6. OTHER SYMPTOMS: \"Do you have any symptoms?\" (e.g., blurred vision, chest pain, difficulty breathing, headache, weakness)      Pt feels stressed at times.   7. PREGNANCY: \"Is there any chance you are pregnant?\" \"When was your last menstrual period?\"      N/a    Protocols used: Blood Pressure - High-Adult-[place]    "

## 2025-07-01 DIAGNOSIS — I10 HYPERTENSION, UNSPECIFIED TYPE: Primary | ICD-10-CM

## 2025-07-01 RX ORDER — AMLODIPINE BESYLATE 5 MG/1
5 TABLET ORAL DAILY
Start: 2025-07-01

## 2025-07-01 RX ORDER — HYDROCHLOROTHIAZIDE 12.5 MG/1
12.5 TABLET ORAL DAILY
Qty: 30 TABLET | Refills: 5 | Status: SHIPPED | OUTPATIENT
Start: 2025-07-01

## 2025-07-17 ENCOUNTER — APPOINTMENT (OUTPATIENT)
Dept: LAB | Facility: CLINIC | Age: 55
End: 2025-07-17
Payer: COMMERCIAL

## 2025-07-17 DIAGNOSIS — I10 HYPERTENSION, UNSPECIFIED TYPE: ICD-10-CM

## 2025-07-17 LAB
ANION GAP SERPL CALCULATED.3IONS-SCNC: 6 MMOL/L (ref 4–13)
BUN SERPL-MCNC: 15 MG/DL (ref 5–25)
CALCIUM SERPL-MCNC: 9.3 MG/DL (ref 8.4–10.2)
CHLORIDE SERPL-SCNC: 103 MMOL/L (ref 96–108)
CO2 SERPL-SCNC: 31 MMOL/L (ref 21–32)
CREAT SERPL-MCNC: 0.83 MG/DL (ref 0.6–1.3)
GFR SERPL CREATININE-BSD FRML MDRD: 79 ML/MIN/1.73SQ M
GLUCOSE P FAST SERPL-MCNC: 91 MG/DL (ref 65–99)
POTASSIUM SERPL-SCNC: 4.1 MMOL/L (ref 3.5–5.3)
SODIUM SERPL-SCNC: 140 MMOL/L (ref 135–147)

## 2025-07-17 PROCEDURE — 80048 BASIC METABOLIC PNL TOTAL CA: CPT

## 2025-07-17 PROCEDURE — 36415 COLL VENOUS BLD VENIPUNCTURE: CPT

## 2025-07-18 ENCOUNTER — RESULTS FOLLOW-UP (OUTPATIENT)
Dept: CARDIOLOGY CLINIC | Facility: CLINIC | Age: 55
End: 2025-07-18

## 2025-07-18 NOTE — TELEPHONE ENCOUNTER
----- Message from LIZET Heller sent at 7/18/2025  7:04 AM EDT -----  BNP is stable. Renal function is normal and electrolytes look very good.  ----- Message -----  From: Lab, Background User  Sent: 7/17/2025   7:27 PM EDT  To: LIZET Heller

## 2025-07-18 NOTE — TELEPHONE ENCOUNTER
I called and spoke with patient, made aware of results.   She verbalized understanding and is pleased with results.

## 2025-08-20 DIAGNOSIS — I10 HYPERTENSION, UNSPECIFIED TYPE: ICD-10-CM

## 2025-08-21 RX ORDER — AMLODIPINE BESYLATE 5 MG/1
5 TABLET ORAL DAILY
Qty: 90 TABLET | Refills: 1 | Status: SHIPPED | OUTPATIENT
Start: 2025-08-21